# Patient Record
Sex: FEMALE | Race: WHITE | Employment: FULL TIME | URBAN - METROPOLITAN AREA
[De-identification: names, ages, dates, MRNs, and addresses within clinical notes are randomized per-mention and may not be internally consistent; named-entity substitution may affect disease eponyms.]

---

## 2021-01-18 ENCOUNTER — TELEPHONE (OUTPATIENT)
Dept: SCHEDULING | Facility: IMAGING CENTER | Age: 56
End: 2021-01-18

## 2021-11-02 ENCOUNTER — OFFICE VISIT (OUTPATIENT)
Dept: MEDICAL GROUP | Facility: CLINIC | Age: 56
End: 2021-11-02
Payer: COMMERCIAL

## 2021-11-02 VITALS
HEART RATE: 82 BPM | OXYGEN SATURATION: 94 % | BODY MASS INDEX: 23.81 KG/M2 | DIASTOLIC BLOOD PRESSURE: 99 MMHG | SYSTOLIC BLOOD PRESSURE: 156 MMHG | HEIGHT: 65 IN | WEIGHT: 142.9 LBS

## 2021-11-02 DIAGNOSIS — M13.0 POLYARTHRITIS: ICD-10-CM

## 2021-11-02 DIAGNOSIS — I10 HYPERTENSION, UNSPECIFIED TYPE: ICD-10-CM

## 2021-11-02 PROCEDURE — 99205 OFFICE O/P NEW HI 60 MIN: CPT | Mod: GC | Performed by: STUDENT IN AN ORGANIZED HEALTH CARE EDUCATION/TRAINING PROGRAM

## 2021-11-02 RX ORDER — OMEPRAZOLE 20 MG/1
20 CAPSULE, DELAYED RELEASE ORAL DAILY
COMMUNITY
End: 2022-07-28 | Stop reason: SDUPTHER

## 2021-11-02 RX ORDER — METAXALONE 400 MG/1
800 TABLET ORAL 3 TIMES DAILY
Qty: 180 TABLET | Refills: 0 | Status: SHIPPED | OUTPATIENT
Start: 2021-11-02 | End: 2021-11-16

## 2021-11-02 RX ORDER — LISINOPRIL 20 MG/1
TABLET ORAL
COMMUNITY
Start: 2021-09-15 | End: 2021-11-02 | Stop reason: SINTOL

## 2021-11-02 RX ORDER — LOSARTAN POTASSIUM 25 MG/1
25 TABLET ORAL DAILY
Qty: 30 TABLET | Refills: 1 | Status: SHIPPED | OUTPATIENT
Start: 2021-11-02 | End: 2021-12-13 | Stop reason: SDUPTHER

## 2021-11-02 NOTE — PROGRESS NOTES
Subjective:     CC:  Establish care     HISTORY OF THE PRESENT ILLNESS: Patient is a 56 y.o. female. This pleasant patient is here today to establish care and discuss hypertension and diffuse joint pain.   She has a history of hypertension, currently on lisinopril for the last 2 months and she states is giving her unwanted side effects and not adequately controlling her blood pressure.  She states she has always had elevated blood pressure but no one had addressed until a couple of months ago.  She denies any family history of hypertension.  She presented today with joint pain in multiple joints including bilateral elbows, bilateral knees, bilateral shoulders, bilateral hips, and bilateral hands.  She states that when she wakes up in the morning her hands are swollen and stiff, and this takes several hours for the swelling to resolve and the pain only minimally improves.  Per patient, she previously had a work-up done for rheumatoid arthritis about 5 years ago that was positive but no treatment was started at this time.  She has a history of bleeding ulcers so she does not frequently take NSAIDs.  She has been taking for 1000 mg Tylenol twice daily without alleviation.  She states that she recently decreased her Tylenol use because she had elevated liver enzymes.  She works as a  and is going to school to become a , and this pain is now interfering with her daily life.  Her diet consists of only plant-based as she has not tolerated dairy products or meat well in the past.  She also has complaints today of chronic abdominal pain, but states that recently she has felt better.  She has no change in bowel movements.      Problem   Polyarthritis   Hypertension       Current Outpatient Medications Ordered in Epic   Medication Sig Dispense Refill   • omeprazole (PRILOSEC) 20 MG delayed-release capsule Take 20 mg by mouth every day.     • losartan (COZAAR) 25 MG Tab Take 1 Tablet by mouth every  "day. 30 Tablet 1   • metaxalone (SKELAXIN) 400 MG Tab Take 2 Tablets by mouth 3 times a day. 180 Tablet 0     No current Epic-ordered facility-administered medications on file.       Health Maintenance: Colonoscopy up-to-date and was normal.  Hysterectomy several years ago, and recently had vaginal exam performed by gynecologist which she states was normal.  She has a mammogram scheduled.    ROS:   Gen: no fevers/chills, no changes in weight  Eyes: no changes in vision  ENT: no sore throat, no hearing loss, no bloody nose  Pulm: no sob, no cough  CV: no chest pain, no palpitations  GI: no nausea/vomiting, no diarrhea. Abdominal pain-intermittent.   : no dysuria  MSk: Joint pain  Skin: no rash  Neuro: no headaches, no numbness/tingling  Heme/Lymph: no easy bruising      Objective:     Exam: /99 (BP Location: Right arm, Patient Position: Sitting, BP Cuff Size: Adult)   Pulse 82   Ht 1.638 m (5' 4.5\")   Wt 64.8 kg (142 lb 14.4 oz)   SpO2 94%  Body mass index is 24.15 kg/m².    General: Normal appearing. No distress.  HEENT: Normocephalic. Eyes conjunctiva clear lids without ptosis, pupils equal and reactive to light accommodation, ears normal shape and contour  Neck: Supple without JVD  Pulmonary: Clear to ausculation.  Normal effort. No rales, ronchi, or wheezing.  Cardiovascular: Regular rate and rhythm without murmur. Carotid and radial pulses are intact and equal bilaterally.  Abdomen: Diffuse tenderness to palpation.  Well-healed surgical scars noted to the abdomen. Soft, nondistended. Normal bowel sounds. Liver and spleen are not palpable  Neurologic: Grossly nonfocal  Skin: Warm and dry.  No obvious lesions.  Musculoskeletal: Tenderness to palpation in PIP and MCP joints of bilaterally.  Tenderness to palpation in bilateral elbows. No extremity cyanosis, clubbing, or edema.  Psych: Normal mood and affect. Alert and oriented x3. Judgment and insight is normal.    Labs: CRP, sed rate, RA factor, and " CCP antibody    Assessment & Plan:   56 y.o. female with the following -    Problem List Items Addressed This Visit     Polyarthritis     Patient with several year history of diffuse joint pain.  Per patient about 5 years ago she tested positive for rheumatoid arthritis however states that she was not started on treatment at this time.  She has had increased joint pain over the last several months that is now interfering with her daily life as she is a  and currently in school.  She has a history of bleeding ulcers and currently limits her intake of NSAIDs.  She has tried 4 1000 mg Tylenol twice daily without improvement as well as CBD oil and the joints and hot showers with minimal alleviation.  Repeat labs for evaluation of rheumatoid arthritis ordered.  Metaxalone 800 mg 3 times daily for pain control as patient is unable to tolerate pain medications and states that this has worked for her in the past.  Patient will follow up in 1 month.         Relevant Medications    metaxalone (SKELAXIN) 400 MG Tab    Other Relevant Orders    RHEUMATOID ARTHRITIS FACTOR    CCP ANTIBODY    Sed Rate    CRP QUANTITIVE (NON-CARDIAC)    Hypertension     Patient with chronic history of hypertension currently taking lisinopril which she states has not worked for her and is giving her unwanted side effects.  Her blood pressure was still elevated at 156/99 today in the office.  Discontinued lisinopril and started losartan 25 mg daily and instructed patient to perform ambulatory blood pressure monitoring.  She recently had lab work done at [place] which included a CBC and CMP, will plan to obtain lab results.  If unable to obtain lab results will order repeat CBC and CMP at next visit.  Patient will follow up in 1 month.         Relevant Medications    losartan (COZAAR) 25 MG Tab          I spent a total of 60 minutes with record review, exam, communication with the patient, communication with other providers, and  documentation of this encounter.    Return in about 4 weeks (around 11/30/2021) for Long.    Please note that this dictation was created using voice recognition software. I have made every reasonable attempt to correct obvious errors, but I expect that there are errors of grammar and possibly content that I did not discover before finalizing the note.

## 2021-11-02 NOTE — ASSESSMENT & PLAN NOTE
Patient with several year history of diffuse joint pain.  Per patient about 5 years ago she tested positive for rheumatoid arthritis however states that she was not started on treatment at this time.  She has had increased joint pain over the last several months that is now interfering with her daily life as she is a  and currently in school.  She has a history of bleeding ulcers and currently limits her intake of NSAIDs.  She has tried 4 1000 mg Tylenol twice daily without improvement as well as CBD oil and the joints and hot showers with minimal alleviation.  Repeat labs for evaluation of rheumatoid arthritis ordered.  Metaxalone 800 mg 3 times daily for pain control as patient is unable to tolerate pain medications and states that this has worked for her in the past.  Patient will follow up in 1 month.

## 2021-11-02 NOTE — ASSESSMENT & PLAN NOTE
Patient with chronic history of hypertension currently taking lisinopril which she states has not worked for her and is giving her unwanted side effects.  Her blood pressure was still elevated at 156/99 today in the office.  Discontinued lisinopril and started losartan 25 mg daily and instructed patient to perform ambulatory blood pressure monitoring.  She recently had lab work done at [place] which included a CBC and CMP, will plan to obtain lab results.  If unable to obtain lab results will order repeat CBC and CMP at next visit.  Patient will follow up in 1 month.

## 2021-11-15 ENCOUNTER — TELEPHONE (OUTPATIENT)
Dept: MEDICAL GROUP | Facility: CLINIC | Age: 56
End: 2021-11-15

## 2021-11-15 NOTE — TELEPHONE ENCOUNTER
Patient came in today with some medication questions from her visit with you on 11/2.  1) Losartan is not working for her. She said she can feel when it is getting high, took her BP at home and it was ~160/100. She is wondering if she should up the dose of Losartan.   2) The muscle relaxer (metaxalone) prescribed is $500 with insurance. She is wondering if there is an alternative medication she can try.    She also got her labs done, I am going to schedule her a follow up appointment per the pt to go over labs and possibly high BP.  Thank you!

## 2021-11-16 RX ORDER — CYCLOBENZAPRINE HCL 5 MG
5-10 TABLET ORAL 3 TIMES DAILY PRN
Qty: 30 TABLET | Refills: 0 | Status: SHIPPED | OUTPATIENT
Start: 2021-11-16 | End: 2021-12-01

## 2021-11-16 NOTE — PROGRESS NOTES
Patient called the office and stated that the medication is too much with insurance. Discontinued Skelaxin and sent prescription for Flexeril with plan for patient to follow-up in the office for back pain.

## 2021-11-16 NOTE — TELEPHONE ENCOUNTER
I have advised her to up her dose of Losartan to 50mg, patient expressed understanding. I have scheduled her for a follow up appointment. She wanted me to let you know that her pain has gotten much worse and she is not sure she can wait until her appointment for further evaluation regarding pain. Please let me know further information I can give to the patient. Thank you!

## 2021-11-30 ENCOUNTER — TELEPHONE (OUTPATIENT)
Dept: MEDICAL GROUP | Facility: CLINIC | Age: 56
End: 2021-11-30

## 2021-11-30 NOTE — TELEPHONE ENCOUNTER
VOICEMAIL  1. Caller Name: Courtney Gupta                      Call Back Number: [phone]    2. Message: Pt L/M regarding appointment time and lab results. I called patient and L/M requesting call back    3. Patient approves office to leave a detailed voicemail/MyChart message: no

## 2021-12-01 ENCOUNTER — OFFICE VISIT (OUTPATIENT)
Dept: MEDICAL GROUP | Facility: CLINIC | Age: 56
End: 2021-12-01
Payer: COMMERCIAL

## 2021-12-01 VITALS
RESPIRATION RATE: 16 BRPM | OXYGEN SATURATION: 97 % | HEART RATE: 94 BPM | SYSTOLIC BLOOD PRESSURE: 146 MMHG | WEIGHT: 144 LBS | DIASTOLIC BLOOD PRESSURE: 94 MMHG | BODY MASS INDEX: 23.99 KG/M2 | HEIGHT: 65 IN

## 2021-12-01 DIAGNOSIS — R53.82 CHRONIC FATIGUE: ICD-10-CM

## 2021-12-01 DIAGNOSIS — I10 HYPERTENSION, UNSPECIFIED TYPE: ICD-10-CM

## 2021-12-01 DIAGNOSIS — M13.0 POLYARTHRITIS: ICD-10-CM

## 2021-12-01 DIAGNOSIS — G89.29 CHRONIC NONINTRACTABLE HEADACHE, UNSPECIFIED HEADACHE TYPE: ICD-10-CM

## 2021-12-01 DIAGNOSIS — R51.9 CHRONIC NONINTRACTABLE HEADACHE, UNSPECIFIED HEADACHE TYPE: ICD-10-CM

## 2021-12-01 DIAGNOSIS — R06.83 SNORING: ICD-10-CM

## 2021-12-01 PROCEDURE — 99214 OFFICE O/P EST MOD 30 MIN: CPT | Mod: GC | Performed by: STUDENT IN AN ORGANIZED HEALTH CARE EDUCATION/TRAINING PROGRAM

## 2021-12-01 RX ORDER — CYCLOBENZAPRINE HCL 5 MG
5 TABLET ORAL 3 TIMES DAILY PRN
Qty: 30 TABLET | Refills: 0 | Status: SHIPPED | OUTPATIENT
Start: 2021-12-01 | End: 2021-12-01

## 2021-12-01 RX ORDER — CYCLOBENZAPRINE HCL 5 MG
5 TABLET ORAL 3 TIMES DAILY PRN
Qty: 30 TABLET | Refills: 0 | Status: SHIPPED | OUTPATIENT
Start: 2021-12-01 | End: 2021-12-22

## 2021-12-01 RX ORDER — MELOXICAM 7.5 MG/1
7.5 TABLET ORAL DAILY
Qty: 30 TABLET | Refills: 1 | Status: SHIPPED | OUTPATIENT
Start: 2021-12-01 | End: 2022-02-02

## 2021-12-01 RX ORDER — MELOXICAM 7.5 MG/1
7.5 TABLET ORAL DAILY
Qty: 30 TABLET | Refills: 1 | Status: SHIPPED | OUTPATIENT
Start: 2021-12-01 | End: 2021-12-01

## 2021-12-01 RX ORDER — AMLODIPINE BESYLATE 5 MG/1
5 TABLET ORAL DAILY
Qty: 30 TABLET | Refills: 1 | Status: SHIPPED | OUTPATIENT
Start: 2021-12-01 | End: 2022-03-09 | Stop reason: SDUPTHER

## 2021-12-01 RX ORDER — AMLODIPINE BESYLATE 5 MG/1
5 TABLET ORAL DAILY
Qty: 30 TABLET | Refills: 1 | Status: SHIPPED | OUTPATIENT
Start: 2021-12-01 | End: 2021-12-01

## 2021-12-01 ASSESSMENT — ENCOUNTER SYMPTOMS
EYES NEGATIVE: 1
MYALGIAS: 1
HEADACHES: 1
GASTROINTESTINAL NEGATIVE: 1
RESPIRATORY NEGATIVE: 1
CARDIOVASCULAR NEGATIVE: 1

## 2021-12-02 NOTE — ASSESSMENT & PLAN NOTE
Chronic, uncontrolled  Patient has a history of chronic fatigue, that she states she has been dealing with for decades.  Per patient, she was previously worked up for thyroid disorder which was negative.  She wakes up and does not feel refreshed, and is feeling like she needs to go to bed around 2 PM daily and does not have the energy to spend time with her family.  She also has a history of snoring as well as daily headaches.  She also has a history of hypertension, difficult to control with medication referral to pulmonology and sleep ordered to evaluate for sleep apnea.  Patient will follow up in 2 to 3 weeks.

## 2021-12-02 NOTE — ASSESSMENT & PLAN NOTE
Patient with self-reported history of rheumatoid arthritis, and has never been treated for this.  Her rheumatoid factor was slightly elevated 22.3 but CCP antibody, CRP, sed rate all within normal limits.  Discussed use of NSAIDs for treatment of arthritis.  Patient has a history of bleeding ulcers while previously taking Advil twice daily.  She is currently on omeprazole 20 mg once daily and has been doing well on this.  Discussed with patient, who feels comfortable in trying meloxicam daily to help with the joint pain.  Prescription for 7.5 mg meloxicam daily sent to pharmacy with plan for patient to follow-up in 2 to 3 weeks.  Patient understood and verbalized agreement to this plan.

## 2021-12-02 NOTE — ASSESSMENT & PLAN NOTE
Chronic, uncontrolled  Patient has a history of frequent headaches which are not controlled with Tylenol.  She has had these headaches for several years.  These are associated with fatigue and snoring, referral to pulmonology and sleep medicine placed to evaluate for sleep apnea.  Patient will follow up in 2 weeks.

## 2021-12-02 NOTE — ASSESSMENT & PLAN NOTE
Chronic, uncontrolled.  Patient increased 25 mg losartan daily to 50 mg losartan daily after a discussion approximately 2 weeks ago her blood pressure was still uncontrolled on low-dose losartan.  She is still having uncontrolled blood pressure with blood pressure in office today 146/94.  She has not been monitoring her blood pressure at home.  She is occasionally symptomatic with some dizziness and headaches.  amlodipine 5 mg daily was added to medication for better control blood pressure.  Discussed with patient that she should monitor her blood pressure at home approximately 2-3 times per week and document these.  Patient will follow up in 2 to 3 weeks.  Patient understood and verbalized agreement to this plan.

## 2021-12-02 NOTE — PROGRESS NOTES
Subjective:     CC: Follow-up    HPI:   Courtney presents today for follow-up on labs, joint pain, and high blood pressure.  She states that she increased her dose of losartan from 25 mg daily to 50 mg daily with minimal improvement in her blood pressure. She is still having headaches as well as occasionally feeling lightheaded. She does not monitor her blood pressure at home.  She is still having joint pain and bilateral shoulders, bilateral elbows, bilateral hands, and bilateral knees. She was unable to  the prescription for the Flexeril, and she states pharmacy needed to get in contact with the clinic. She has been taking Tylenol at home without alleviation of the pain.  She does have a previous diagnosis of rheumatoid arthritis, but was never treated for this.  She denies any abdominal pain, chest pain, shortness of breath, or syncope.    Problem   Chronic Fatigue   Chronic Nonintractable Headache   Polyarthritis   Hypertension       Current Outpatient Medications Ordered in Epic   Medication Sig Dispense Refill   • amLODIPine (NORVASC) 5 MG Tab Take 1 Tablet by mouth every day. 30 Tablet 1   • cyclobenzaprine (FLEXERIL) 5 mg tablet Take 1 Tablet by mouth 3 times a day as needed. 30 Tablet 0   • meloxicam (MOBIC) 7.5 MG Tab Take 1 Tablet by mouth every day. 30 Tablet 1   • losartan (COZAAR) 25 MG Tab Take 1 Tablet by mouth every day. (Patient taking differently: Take 50 mg by mouth every day.) 30 Tablet 1   • omeprazole (PRILOSEC) 20 MG delayed-release capsule Take 20 mg by mouth every day.       No current [facility]-ordered facility-administered medications on file.       ROS:  Review of Systems   Constitutional: Positive for malaise/fatigue.   HENT: Negative.    Eyes: Negative.    Respiratory: Negative.    Cardiovascular: Negative.    Gastrointestinal: Negative.    Musculoskeletal: Positive for joint pain and myalgias.   Skin: Negative.    Neurological: Positive for headaches.       Objective:     Exam:  BP  "146/94 (BP Location: Right arm, Patient Position: Sitting, BP Cuff Size: Adult)   Pulse 94   Resp 16   Ht 1.638 m (5' 4.5\")   Wt 65.3 kg (144 lb)   SpO2 97%   BMI 24.34 kg/m²  Body mass index is 24.34 kg/m².    Physical Exam  Constitutional:       General: She is not in acute distress.     Appearance: Normal appearance. She is normal weight.   HENT:      Head: Normocephalic and atraumatic.   Eyes:      Extraocular Movements: Extraocular movements intact.   Cardiovascular:      Rate and Rhythm: Normal rate and regular rhythm.      Heart sounds: No murmur heard.      Pulmonary:      Effort: Pulmonary effort is normal. No respiratory distress.      Breath sounds: Normal breath sounds. No wheezing.   Abdominal:      General: Abdomen is flat. There is no distension.   Musculoskeletal:         General: Normal range of motion.      Cervical back: Normal range of motion.   Skin:     General: Skin is warm and dry.      Findings: No rash.   Neurological:      General: No focal deficit present.      Mental Status: She is alert.         Labs:   CRP within normal limits  Sed rate within normal limits  CCP antibody within normal limits  Rheumatoid arthritis factor elevated at 22.3.    Assessment & Plan:     56 y.o. female with the following -     Problem List Items Addressed This Visit     Polyarthritis     Patient with self-reported history of rheumatoid arthritis, and has never been treated for this.  Her rheumatoid factor was slightly elevated 22.3 but CCP antibody, CRP, sed rate all within normal limits.  Discussed use of NSAIDs for treatment of arthritis.  Patient has a history of bleeding ulcers while previously taking Advil twice daily.  She is currently on omeprazole 20 mg once daily and has been doing well on this.  Discussed with patient, who feels comfortable in trying meloxicam daily to help with the joint pain.  Prescription for 7.5 mg meloxicam daily sent to pharmacy with plan for patient to follow-up in 2 to " 3 weeks.  Patient understood and verbalized agreement to this plan.         Hypertension     Chronic, uncontrolled.  Patient increased 25 mg losartan daily to 50 mg losartan daily after a discussion approximately 2 weeks ago her blood pressure was still uncontrolled on low-dose losartan.  She is still having uncontrolled blood pressure with blood pressure in office today 146/94.  She has not been monitoring her blood pressure at home.  She is occasionally symptomatic with some dizziness and headaches.  amlodipine 5 mg daily was added to medication for better control blood pressure.  Discussed with patient that she should monitor her blood pressure at home approximately 2-3 times per week and document these.  Patient will follow up in 2 to 3 weeks.  Patient understood and verbalized agreement to this plan.         Relevant Medications    amLODIPine (NORVASC) 5 MG Tab    Other Relevant Orders    Referral to Pulmonary and Sleep Medicine    Chronic fatigue     Chronic, uncontrolled  Patient has a history of chronic fatigue, that she states she has been dealing with for decades.  Per patient, she was previously worked up for thyroid disorder which was negative.  She wakes up and does not feel refreshed, and is feeling like she needs to go to bed around 2 PM daily and does not have the energy to spend time with her family.  She also has a history of snoring as well as daily headaches.  She also has a history of hypertension, difficult to control with medication referral to pulmonology and sleep ordered to evaluate for sleep apnea.  Patient will follow up in 2 to 3 weeks.         Relevant Orders    Referral to Pulmonary and Sleep Medicine    Chronic nonintractable headache     Chronic, uncontrolled  Patient has a history of frequent headaches which are not controlled with Tylenol.  She has had these headaches for several years.  These are associated with fatigue and snoring, referral to pulmonology and sleep medicine  placed to evaluate for sleep apnea.  Patient will follow up in 2 weeks.         Relevant Orders    Referral to Pulmonary and Sleep Medicine      Other Visit Diagnoses     Snoring        Relevant Orders    Referral to Pulmonary and Sleep Medicine          I spent a total of 45 minutes with record review, exam, communication with the patient, communication with other providers, and documentation of this encounter.      Return in about 2 weeks (around 12/15/2021) for Short.    Please note that this dictation was created using voice recognition software. I have made every reasonable attempt to correct obvious errors, but I expect that there are errors of grammar and possibly content that I did not discover before finalizing the note.

## 2022-02-02 RX ORDER — MELOXICAM 7.5 MG/1
7.5 TABLET ORAL DAILY
Qty: 30 TABLET | Refills: 1 | Status: SHIPPED | OUTPATIENT
Start: 2022-02-02 | End: 2022-04-08

## 2022-04-05 DIAGNOSIS — I10 HYPERTENSION, UNSPECIFIED TYPE: ICD-10-CM

## 2022-04-05 RX ORDER — LOSARTAN POTASSIUM 50 MG/1
50 TABLET ORAL DAILY
Qty: 30 TABLET | Refills: 3 | Status: SHIPPED | OUTPATIENT
Start: 2022-04-05 | End: 2022-06-19 | Stop reason: SDUPTHER

## 2022-04-08 RX ORDER — MELOXICAM 7.5 MG/1
7.5 TABLET ORAL DAILY
Qty: 30 TABLET | Refills: 1 | Status: SHIPPED | OUTPATIENT
Start: 2022-04-08 | End: 2022-05-21 | Stop reason: SDUPTHER

## 2022-04-08 RX ORDER — MELOXICAM 7.5 MG/1
7.5 TABLET ORAL DAILY
Qty: 30 TABLET | Refills: 1 | Status: SHIPPED | OUTPATIENT
Start: 2022-04-08 | End: 2022-04-08

## 2022-05-23 RX ORDER — CYCLOBENZAPRINE HCL 5 MG
5 TABLET ORAL 3 TIMES DAILY PRN
Qty: 90 TABLET | Refills: 0 | Status: SHIPPED | OUTPATIENT
Start: 2022-05-23 | End: 2022-06-19 | Stop reason: SDUPTHER

## 2022-05-23 NOTE — TELEPHONE ENCOUNTER
Patient called again requesting refill of medication. She states she is in a lot of pain without it, please advise. Thank you!

## 2022-06-19 DIAGNOSIS — I10 HYPERTENSION, UNSPECIFIED TYPE: ICD-10-CM

## 2022-06-20 RX ORDER — LOSARTAN POTASSIUM 50 MG/1
50 TABLET ORAL DAILY
Qty: 90 TABLET | Refills: 3 | Status: SHIPPED | OUTPATIENT
Start: 2022-06-20 | End: 2022-07-28 | Stop reason: SDUPTHER

## 2022-07-28 ENCOUNTER — OFFICE VISIT (OUTPATIENT)
Dept: MEDICAL GROUP | Facility: CLINIC | Age: 57
End: 2022-07-28

## 2022-07-28 VITALS
SYSTOLIC BLOOD PRESSURE: 120 MMHG | HEART RATE: 85 BPM | TEMPERATURE: 97.9 F | DIASTOLIC BLOOD PRESSURE: 80 MMHG | WEIGHT: 138.8 LBS | RESPIRATION RATE: 14 BRPM | BODY MASS INDEX: 23.13 KG/M2 | HEIGHT: 65 IN | OXYGEN SATURATION: 98 %

## 2022-07-28 DIAGNOSIS — R00.2 PALPITATIONS: ICD-10-CM

## 2022-07-28 DIAGNOSIS — Z12.31 ENCOUNTER FOR SCREENING MAMMOGRAM FOR MALIGNANT NEOPLASM OF BREAST: ICD-10-CM

## 2022-07-28 DIAGNOSIS — Z12.12 SCREENING FOR COLORECTAL CANCER: ICD-10-CM

## 2022-07-28 DIAGNOSIS — Z23 NEED FOR VACCINATION: ICD-10-CM

## 2022-07-28 DIAGNOSIS — Z12.11 SCREENING FOR COLORECTAL CANCER: ICD-10-CM

## 2022-07-28 DIAGNOSIS — I10 HYPERTENSION, UNSPECIFIED TYPE: ICD-10-CM

## 2022-07-28 DIAGNOSIS — N20.0 KIDNEY STONES: ICD-10-CM

## 2022-07-28 DIAGNOSIS — N81.10 VAGINAL PROLAPSE: ICD-10-CM

## 2022-07-28 PROCEDURE — 90471 IMMUNIZATION ADMIN: CPT | Performed by: STUDENT IN AN ORGANIZED HEALTH CARE EDUCATION/TRAINING PROGRAM

## 2022-07-28 PROCEDURE — 99214 OFFICE O/P EST MOD 30 MIN: CPT | Mod: 25,GC | Performed by: STUDENT IN AN ORGANIZED HEALTH CARE EDUCATION/TRAINING PROGRAM

## 2022-07-28 PROCEDURE — 90715 TDAP VACCINE 7 YRS/> IM: CPT | Performed by: STUDENT IN AN ORGANIZED HEALTH CARE EDUCATION/TRAINING PROGRAM

## 2022-07-28 RX ORDER — LOSARTAN POTASSIUM 50 MG/1
50 TABLET ORAL DAILY
Qty: 90 TABLET | Refills: 3 | Status: SHIPPED | OUTPATIENT
Start: 2022-07-28 | End: 2022-09-19 | Stop reason: SDUPTHER

## 2022-07-28 RX ORDER — CYCLOBENZAPRINE HCL 5 MG
5 TABLET ORAL 3 TIMES DAILY PRN
Qty: 90 TABLET | Refills: 0 | Status: SHIPPED | OUTPATIENT
Start: 2022-07-28 | End: 2022-09-19 | Stop reason: SDUPTHER

## 2022-07-28 RX ORDER — MELOXICAM 7.5 MG/1
7.5 TABLET ORAL DAILY
Qty: 30 TABLET | Refills: 1 | Status: SHIPPED | OUTPATIENT
Start: 2022-07-28 | End: 2022-10-17 | Stop reason: SDUPTHER

## 2022-07-28 RX ORDER — OMEPRAZOLE 20 MG/1
20 CAPSULE, DELAYED RELEASE ORAL 2 TIMES DAILY
Qty: 60 CAPSULE | Refills: 0 | Status: SHIPPED | OUTPATIENT
Start: 2022-07-28 | End: 2022-08-27

## 2022-07-28 RX ORDER — AMLODIPINE BESYLATE 5 MG/1
5 TABLET ORAL DAILY
Qty: 90 TABLET | Refills: 3 | Status: SHIPPED | OUTPATIENT
Start: 2022-07-28 | End: 2023-01-06 | Stop reason: SDUPTHER

## 2022-07-28 ASSESSMENT — ENCOUNTER SYMPTOMS
BACK PAIN: 1
CONSTITUTIONAL NEGATIVE: 1
GASTROINTESTINAL NEGATIVE: 1
RESPIRATORY NEGATIVE: 1
PALPITATIONS: 1

## 2022-07-28 ASSESSMENT — PATIENT HEALTH QUESTIONNAIRE - PHQ9: CLINICAL INTERPRETATION OF PHQ2 SCORE: 0

## 2022-07-28 NOTE — ASSESSMENT & PLAN NOTE
History of hysterectomy.  Patient with a several year history of vaginal prolapse that was previously evaluated and states that this has been significantly worsening to a point where she has large prolapse following a days work.  Patient would like referral for evaluation for surgical intervention at this time.  Urgent referral sent to Dr. Maher with urogynecology for further evaluation for surgical intervention.

## 2022-07-28 NOTE — PROGRESS NOTES
"Subjective:     CC: follow-up, vaginal prolapse, palpitations, blood pressure    HPI:   Courtney presents today for medication follow-up and to discuss palpitations, blood pressure, and vaginal prolapse. Patient states that she has been out of her blood pressure medications for approximately 1 week as she had a change in insurance and was paying out of pocket for these.  She has otherwise been doing well on these medications.  Patient reports history of vaginal prolapse that has significantly worsened recently. She had a hysterectomy several years ago.  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  She also states that she recently has noticed some darkening of her labia and at one point she described these as black in color.  Patient reports that she has been doing well on the meloxicam and has been using Flexeril intermittently.  She states that she has been doing vitamin B12 injection as well as other vitamin shots and has felt significantly better following these.  She has also been improving diet to avoid inflammatory foods and has been feeling well doing this.  Patient states that she has been getting palpitations, mainly when she is laying down at night.  She denies any chest pain or shortness of breath associated with this.    Current Outpatient Medications Ordered in Epic   Medication Sig Dispense Refill   • amLODIPine (NORVASC) 5 MG Tab Take 1 Tablet by mouth every day. 90 Tablet 3   • meloxicam (MOBIC) 7.5 MG Tab Take 1 Tablet by mouth every day. 30 Tablet 1   • cyclobenzaprine (FLEXERIL) 5 mg tablet Take 1 Tablet by mouth 3 times a day as needed for Moderate Pain or Muscle Spasms. 90 Tablet 0   • losartan (COZAAR) 50 MG Tab Take 1 Tablet by mouth every day. 90 Tablet 3   • omeprazole (PRILOSEC) 20 MG delayed-release capsule Take 1 Capsule by mouth 2 times a day for 30 days. " "60 Capsule 0     No current Epic-ordered facility-administered medications on file.       Health Maintenance: Completed    ROS:  Review of Systems   Constitutional: Negative.    Respiratory: Negative.    Cardiovascular: Positive for palpitations.   Gastrointestinal: Negative.    Genitourinary:        Kidney stones   Musculoskeletal: Positive for back pain (chronic).       Objective:     Exam:  /80 (BP Location: Right arm, Patient Position: Sitting, BP Cuff Size: Adult)   Pulse 85   Temp 36.6 °C (97.9 °F) (Temporal)   Resp 14   Ht 1.638 m (5' 4.5\")   Wt 63 kg (138 lb 12.8 oz)   SpO2 98%   BMI 23.46 kg/m²  Body mass index is 23.46 kg/m².    Physical Exam  Constitutional:       General: She is not in acute distress.     Appearance: Normal appearance. She is normal weight.   HENT:      Head: Normocephalic and atraumatic.   Eyes:      Extraocular Movements: Extraocular movements intact.      Conjunctiva/sclera: Conjunctivae normal.   Cardiovascular:      Rate and Rhythm: Normal rate and regular rhythm.      Heart sounds: No murmur heard.  Pulmonary:      Effort: Pulmonary effort is normal. No respiratory distress.      Breath sounds: Normal breath sounds. No wheezing.   Abdominal:      General: Abdomen is flat.      Palpations: Abdomen is soft.   Musculoskeletal:         General: Normal range of motion.      Cervical back: Normal range of motion.   Skin:     General: Skin is warm and dry.   Neurological:      General: No focal deficit present.      Mental Status: She is alert.   Psychiatric:         Mood and Affect: Mood normal.         Behavior: Behavior normal.         Thought Content: Thought content normal.         Judgment: Judgment normal.           Assessment & Plan:     56 y.o. female with the following -     Problem List Items Addressed This Visit     Hypertension     Patient with a history of hypertension on amlodipine 5 mg daily and losartan 50 mg daily.  She has been tolerating his medications " well.  Blood pressure in the office today is 120/80.  Patient has been out of medications for approximately 1 week to no insurance changes.  Refill sent for amlodipine and losartan.  Patient will follow up in 3 months.           Relevant Medications    amLODIPine (NORVASC) 5 MG Tab    losartan (COZAAR) 50 MG Tab    Palpitations     Patient with several month history of palpitations that are more significant when she is laying down.  There is no associated chest pain or shortness of breath with these.  EKG in the office today unremarkable.  Referral sent to cardiology for Holter monitor.  CBC ordered to evaluate for anemia.  CMP ordered to evaluate for electrolyte abnormalities as patient has a history of low potassium.  ER precautions discussed with patient including chest pain or shortness of breath and if she develop the symptoms to be repeated to be seen in the ER.  Patient agreeable to plan.           Relevant Orders    Comp Metabolic Panel    CBC WITH DIFFERENTIAL    EKG - Clinic Performed    REFERRAL TO CARDIOLOGY    Vaginal prolapse     History of hysterectomy.  Patient with a several year history of vaginal prolapse that was previously evaluated and states that this has been significantly worsening to a point where she has large prolapse following a days work.  Patient would like referral for evaluation for surgical intervention at this time.  Urgent referral sent to Dr. Maher with urogynecology for further evaluation for surgical intervention.           Relevant Orders    Referral to Urogynecology      Other Visit Diagnoses     Screening for colorectal cancer        Declines colonoscopy.  Agreeable to Cologuard.  Form faxed for Cologuard.  Patient understands if abnormal, will need colonoscopy.    Relevant Orders    COLOGUARD (FIT DNA)    Encounter for screening mammogram for malignant neoplasm of breast        Has not had mammogram done in several years.  Referral sent for mammogram for screening of  malignant neoplasm of breast.    Relevant Orders    MA-SCREENING MAMMO BILAT W/TOMOSYNTHESIS W/CAD    Kidney stones        Recurrent kidney stones.  Most recent a couple of weeks ago.  Referral sent to urology for further evaluation.    Relevant Orders    Referral to Urology    Need for vaccination        Tdap given patient instructed go to pharmacy for Shingrix.    Relevant Orders    Tdap Vaccine =>8YO IM (Completed)          I spent a total of 75 minutes with record review, exam, communication with the patient, communication with other providers, and documentation of this encounter.      Return in about 3 months (around 10/28/2022).      Gale Davis MD  [facility] PGY-2

## 2022-07-28 NOTE — ASSESSMENT & PLAN NOTE
Patient with a history of hypertension on amlodipine 5 mg daily and losartan 50 mg daily.  She has been tolerating his medications well.  Blood pressure in the office today is 120/80.  Patient has been out of medications for approximately 1 week to no insurance changes.  Refill sent for amlodipine and losartan.  Patient will follow up in 3 months.

## 2022-07-28 NOTE — ASSESSMENT & PLAN NOTE
Patient with several month history of palpitations that are more significant when she is laying down.  There is no associated chest pain or shortness of breath with these.  EKG in the office today unremarkable.  Referral sent to cardiology for Holter monitor.  CBC ordered to evaluate for anemia.  CMP ordered to evaluate for electrolyte abnormalities as patient has a history of low potassium.  ER precautions discussed with patient including chest pain or shortness of breath and if she develop the symptoms to be repeated to be seen in the ER.  Patient agreeable to plan.

## 2022-08-04 ENCOUNTER — HOSPITAL ENCOUNTER (OUTPATIENT)
Dept: RADIOLOGY | Facility: MEDICAL CENTER | Age: 57
End: 2022-08-04

## 2022-09-09 RX ORDER — OMEPRAZOLE 20 MG/1
20 CAPSULE, DELAYED RELEASE ORAL 2 TIMES DAILY
COMMUNITY
End: 2022-09-09 | Stop reason: SDUPTHER

## 2022-09-12 RX ORDER — OMEPRAZOLE 20 MG/1
20 CAPSULE, DELAYED RELEASE ORAL 2 TIMES DAILY
Qty: 180 CAPSULE | Refills: 3 | Status: SHIPPED | OUTPATIENT
Start: 2022-09-12 | End: 2023-07-07

## 2022-09-19 DIAGNOSIS — I10 HYPERTENSION, UNSPECIFIED TYPE: ICD-10-CM

## 2022-09-20 RX ORDER — CYCLOBENZAPRINE HCL 5 MG
5 TABLET ORAL 3 TIMES DAILY PRN
Qty: 90 TABLET | Refills: 0 | Status: SHIPPED | OUTPATIENT
Start: 2022-09-20 | End: 2022-10-21 | Stop reason: SDUPTHER

## 2022-09-20 RX ORDER — LOSARTAN POTASSIUM 50 MG/1
50 TABLET ORAL DAILY
Qty: 90 TABLET | Refills: 3 | Status: SHIPPED | OUTPATIENT
Start: 2022-09-20 | End: 2022-10-21 | Stop reason: SDUPTHER

## 2022-10-03 ENCOUNTER — TELEPHONE (OUTPATIENT)
Dept: CARDIOLOGY | Facility: MEDICAL CENTER | Age: 57
End: 2022-10-03

## 2022-10-06 ENCOUNTER — TELEPHONE (OUTPATIENT)
Dept: CARDIOLOGY | Facility: MEDICAL CENTER | Age: 57
End: 2022-10-06

## 2022-10-17 RX ORDER — MELOXICAM 7.5 MG/1
7.5 TABLET ORAL DAILY
Qty: 90 TABLET | Refills: 3 | Status: SHIPPED | OUTPATIENT
Start: 2022-10-17 | End: 2022-10-21 | Stop reason: SDUPTHER

## 2022-10-21 DIAGNOSIS — I10 HYPERTENSION, UNSPECIFIED TYPE: ICD-10-CM

## 2022-10-21 RX ORDER — MELOXICAM 7.5 MG/1
7.5 TABLET ORAL DAILY
Qty: 90 TABLET | Refills: 3 | Status: SHIPPED | OUTPATIENT
Start: 2022-10-21 | End: 2023-04-28 | Stop reason: SDUPTHER

## 2022-10-21 RX ORDER — CYCLOBENZAPRINE HCL 5 MG
5 TABLET ORAL 3 TIMES DAILY PRN
Qty: 90 TABLET | Refills: 0 | Status: SHIPPED | OUTPATIENT
Start: 2022-10-21 | End: 2023-01-06 | Stop reason: SDUPTHER

## 2022-10-21 RX ORDER — LOSARTAN POTASSIUM 50 MG/1
50 TABLET ORAL DAILY
Qty: 90 TABLET | Refills: 3 | Status: SHIPPED | OUTPATIENT
Start: 2022-10-21 | End: 2023-01-06 | Stop reason: SDUPTHER

## 2023-01-06 ENCOUNTER — TELEPHONE (OUTPATIENT)
Dept: MEDICAL GROUP | Facility: CLINIC | Age: 58
End: 2023-01-06
Payer: COMMERCIAL

## 2023-01-06 DIAGNOSIS — I10 HYPERTENSION, UNSPECIFIED TYPE: ICD-10-CM

## 2023-01-06 NOTE — TELEPHONE ENCOUNTER
Hi,    Patient called this morning and needs prescriptions refilled.  She said she has called multiple times and that the pharmacy has also been trying to reach out as well. Patient said she is completely out of meds now.     It is for  her arthritis prescription (cyclelobensaprine:  Patient wasn't sure how to spell     And her BP prescription.    Patient would like this filled as soon as possible.    Thank you!

## 2023-01-09 RX ORDER — LOSARTAN POTASSIUM 50 MG/1
50 TABLET ORAL DAILY
Qty: 90 TABLET | Refills: 3 | Status: SHIPPED | OUTPATIENT
Start: 2023-01-09 | End: 2023-01-13 | Stop reason: SDUPTHER

## 2023-01-09 RX ORDER — CYCLOBENZAPRINE HCL 5 MG
5 TABLET ORAL 3 TIMES DAILY PRN
Qty: 90 TABLET | Refills: 0 | Status: SHIPPED | OUTPATIENT
Start: 2023-01-09 | End: 2023-02-16 | Stop reason: SDUPTHER

## 2023-01-09 RX ORDER — AMLODIPINE BESYLATE 5 MG/1
5 TABLET ORAL DAILY
Qty: 90 TABLET | Refills: 3 | Status: SHIPPED | OUTPATIENT
Start: 2023-01-09 | End: 2023-09-15

## 2023-02-21 RX ORDER — CYCLOBENZAPRINE HCL 5 MG
5 TABLET ORAL 3 TIMES DAILY PRN
Qty: 90 TABLET | Refills: 0 | Status: SHIPPED | OUTPATIENT
Start: 2023-02-21 | End: 2023-03-23

## 2023-03-23 RX ORDER — CYCLOBENZAPRINE HCL 5 MG
TABLET ORAL
Qty: 90 TABLET | Refills: 0 | Status: SHIPPED | OUTPATIENT
Start: 2023-03-23 | End: 2023-04-28 | Stop reason: SDUPTHER

## 2023-06-30 ENCOUNTER — APPOINTMENT (OUTPATIENT)
Dept: URGENT CARE | Facility: CLINIC | Age: 58
End: 2023-06-30
Payer: COMMERCIAL

## 2023-07-01 ENCOUNTER — OFFICE VISIT (OUTPATIENT)
Dept: URGENT CARE | Facility: CLINIC | Age: 58
End: 2023-07-01
Payer: COMMERCIAL

## 2023-07-01 VITALS
SYSTOLIC BLOOD PRESSURE: 138 MMHG | TEMPERATURE: 98 F | BODY MASS INDEX: 23.32 KG/M2 | OXYGEN SATURATION: 99 % | RESPIRATION RATE: 18 BRPM | DIASTOLIC BLOOD PRESSURE: 84 MMHG | WEIGHT: 138 LBS | HEART RATE: 94 BPM

## 2023-07-01 DIAGNOSIS — R20.0 HAND NUMBNESS: ICD-10-CM

## 2023-07-01 DIAGNOSIS — M25.50 ARTHRALGIA, UNSPECIFIED JOINT: ICD-10-CM

## 2023-07-01 DIAGNOSIS — M79.642 PAIN IN BOTH HANDS: ICD-10-CM

## 2023-07-01 DIAGNOSIS — M79.641 PAIN IN BOTH HANDS: ICD-10-CM

## 2023-07-01 PROCEDURE — 3079F DIAST BP 80-89 MM HG: CPT | Performed by: PHYSICIAN ASSISTANT

## 2023-07-01 PROCEDURE — 99204 OFFICE O/P NEW MOD 45 MIN: CPT | Performed by: PHYSICIAN ASSISTANT

## 2023-07-01 PROCEDURE — 3075F SYST BP GE 130 - 139MM HG: CPT | Performed by: PHYSICIAN ASSISTANT

## 2023-07-01 RX ORDER — CYCLOBENZAPRINE HCL 5 MG
5-10 TABLET ORAL 3 TIMES DAILY PRN
Qty: 15 TABLET | Refills: 0 | Status: SHIPPED | OUTPATIENT
Start: 2023-07-01 | End: 2023-07-03

## 2023-07-01 RX ORDER — METHYLPREDNISOLONE 4 MG/1
TABLET ORAL
Qty: 21 TABLET | Refills: 0 | Status: SHIPPED | OUTPATIENT
Start: 2023-07-01 | End: 2023-07-07

## 2023-07-01 ASSESSMENT — FIBROSIS 4 INDEX: FIB4 SCORE: 0.47

## 2023-07-01 NOTE — PROGRESS NOTES
Subjective:   Courtney Gupta is a 57 y.o. female who presents for Numbness (X2wks, Hand numbness and pain mostly right arm and shoulder, swelling, hot, numbness in both hands, random tendon pain )      HPI  The patient presents to the Urgent Care with a primary complaint of hand numbness and pain for about 2 weeks.  History of RA managed with NSAIDs and cyclobenzaprine.  She is a  and does a lot of lifting and gripping.  Will have chronic intermittent joint pain such as shoulder pain, neck pain, back pain.  Pain radiation from her wrist to her elbow as well.  Pain is worse to her right hand and wrist worse with gripping.   strength weak to the right hand.  Associated tingling.  Denies any recent illness, fevers, chills.    The numbness and tingling is located to her first 3 fingers and a half of her fourth finger.      Past Medical History:   Diagnosis Date    Anxiety     Hypertension      Allergies   Allergen Reactions    Other Drug      Narcotics make her sick        Objective:     /84 (BP Location: Left arm, Patient Position: Sitting, BP Cuff Size: Adult)   Pulse 94   Temp 36.7 °C (98 °F) (Temporal)   Resp 18   Wt 62.6 kg (138 lb)   SpO2 99%   BMI 23.32 kg/m²     Physical Exam  Vitals reviewed.   Constitutional:       General: She is not in acute distress.     Appearance: Normal appearance. She is not ill-appearing or toxic-appearing.   Eyes:      Conjunctiva/sclera: Conjunctivae normal.   Neck:      Comments: Negative Spurlings.   Cardiovascular:      Rate and Rhythm: Normal rate.   Pulmonary:      Effort: Pulmonary effort is normal.   Musculoskeletal:      Right shoulder: No swelling, effusion or crepitus. Normal range of motion.      Left shoulder: No swelling, effusion or crepitus. Normal range of motion.      Right upper arm: Normal. No swelling.      Left upper arm: Normal. No swelling.      Right elbow: Normal.      Left elbow: Normal.      Cervical back: Neck supple.       Comments: Positive tinels at the wrist bilaterally. Decreased right sided  strength compared to left. No obvious thenar atrophy comparing both sides. Sensation intact. Pulses intact. Ganglion cyst to right volar wrist (chronic).    Skin:     General: Skin is warm and dry.   Neurological:      General: No focal deficit present.      Mental Status: She is alert and oriented to person, place, and time.   Psychiatric:         Mood and Affect: Mood normal.         Behavior: Behavior normal.         Diagnosis and associated orders:     1. Hand numbness    - Referral to Orthopedics  - methylPREDNISolone (MEDROL DOSEPAK) 4 MG Tablet Therapy Pack; Follow schedule on package instructions.  Dispense: 21 Tablet; Refill: 0    2. Pain in both hands    - Referral to Orthopedics  - methylPREDNISolone (MEDROL DOSEPAK) 4 MG Tablet Therapy Pack; Follow schedule on package instructions.  Dispense: 21 Tablet; Refill: 0    3. Arthralgia, unspecified joint    - cyclobenzaprine (FLEXERIL) 5 mg tablet; Take 1-2 Tablets by mouth 3 times a day as needed for Moderate Pain or Muscle Spasms.  Dispense: 15 Tablet; Refill: 0     Comments/MDM:     Patient's presenting symptoms and exam findings consistent most likely with carpal tunnel bilaterally.  She also might be having a RA flareup.  Discussed treatment of bilateral wrist brace for rest, and support.  Elevation, ice application.  Medrol Dosepak.  Tylenol.  Patient requesting refill of cyclobenzaprine which helps with her RA.  Follow up with Ortho.        I personally reviewed prior external notes and test results pertinent to today's visit. Pathogenesis of diagnosis discussed including typical length and natural progression. Supportive care, natural history, differential diagnoses, and indications for immediate follow-up discussed. Patient expresses understanding and agrees to plan. Patient denies any other questions or concerns.     Follow-up with the primary care physician for recheck,  reevaluation, and consideration of further management.    Please note that this dictation was created using voice recognition software. I have made a reasonable attempt to correct obvious errors, but I expect that there are errors of grammar and possibly content that I did not discover before finalizing the note.    This note was electronically signed by Saroj Gay PA-C

## 2023-07-03 RX ORDER — CYCLOBENZAPRINE HCL 5 MG
TABLET ORAL
Qty: 15 TABLET | Refills: 0 | Status: SHIPPED | OUTPATIENT
Start: 2023-07-03 | End: 2023-07-07 | Stop reason: SDUPTHER

## 2023-07-07 ENCOUNTER — TELEMEDICINE (OUTPATIENT)
Dept: MEDICAL GROUP | Facility: CLINIC | Age: 58
End: 2023-07-07
Payer: COMMERCIAL

## 2023-07-07 DIAGNOSIS — M25.50 ARTHRALGIA, UNSPECIFIED JOINT: ICD-10-CM

## 2023-07-07 DIAGNOSIS — Z12.12 SCREENING FOR COLORECTAL CANCER: ICD-10-CM

## 2023-07-07 DIAGNOSIS — Z13.0 SCREENING FOR DEFICIENCY ANEMIA: ICD-10-CM

## 2023-07-07 DIAGNOSIS — Z11.59 ENCOUNTER FOR HEPATITIS C SCREENING TEST FOR LOW RISK PATIENT: ICD-10-CM

## 2023-07-07 DIAGNOSIS — Z12.31 ENCOUNTER FOR SCREENING MAMMOGRAM FOR BREAST CANCER: ICD-10-CM

## 2023-07-07 DIAGNOSIS — Z12.11 SCREENING FOR COLORECTAL CANCER: ICD-10-CM

## 2023-07-07 DIAGNOSIS — G56.03 BILATERAL CARPAL TUNNEL SYNDROME: ICD-10-CM

## 2023-07-07 DIAGNOSIS — Z13.1 ENCOUNTER FOR SCREENING EXAMINATION FOR INTERMEDIATE HYPERGLYCEMIA AND DIABETES MELLITUS: ICD-10-CM

## 2023-07-07 PROCEDURE — 99213 OFFICE O/P EST LOW 20 MIN: CPT | Mod: GT,GE | Performed by: STUDENT IN AN ORGANIZED HEALTH CARE EDUCATION/TRAINING PROGRAM

## 2023-07-07 RX ORDER — CYCLOBENZAPRINE HCL 5 MG
5 TABLET ORAL 2 TIMES DAILY PRN
Qty: 60 TABLET | Refills: 2 | Status: SHIPPED | OUTPATIENT
Start: 2023-07-07 | End: 2023-09-15

## 2023-07-07 RX ORDER — MELOXICAM 15 MG/1
15 TABLET ORAL DAILY
Qty: 30 TABLET | Refills: 2 | Status: SHIPPED | OUTPATIENT
Start: 2023-07-07 | End: 2023-10-04

## 2023-07-07 ASSESSMENT — PATIENT HEALTH QUESTIONNAIRE - PHQ9: CLINICAL INTERPRETATION OF PHQ2 SCORE: 0

## 2023-07-07 NOTE — PROGRESS NOTES
Virtual Visit: Established Patient   This visit was conducted via Zoom using secure and encrypted videoconferencing technology.   The patient was in their home in the [facility].    The patient's identity was confirmed and verbal consent was obtained for this virtual visit.    Subjective:   CC:   Chief Complaint   Patient presents with    Follow-Up    Medication Refill     Cyclobenzaprine      Courtney Gupta is a 57 y.o. female presenting for evaluation and management of:    Has lost a lot of weight. Have been working out. Eating well. No longer traveling, currently working here in town.     #hand pain  Right hand swelling with numbness. PA at Urgent Care thought she had carpal tunnel syndrome and bursitis. Finished steroid pack. Feeling better after steroid pack. Hand swelling gone after steroid pack.    #hypertension  Has improved at home. No longer taking amlodipine. High 120's/high 80's low 90's at home.  She is interested and discontinuing her antihypertensive medication.  She states that she has lost a lot of weight and has been working out.  She has also improved diet.    ROS   Negative unless listed in HPI.     Current medicines (including changes today)  Current Outpatient Medications   Medication Sig Dispense Refill    meloxicam (MOBIC) 15 MG tablet Take 1 Tablet by mouth every day. 30 Tablet 2    cyclobenzaprine (FLEXERIL) 5 mg tablet Take 1 Tablet by mouth 2 times a day as needed for Muscle Spasms or Moderate Pain. 60 Tablet 2    losartan (COZAAR) 50 MG Tab Take 1 Tablet by mouth every day. 90 Tablet 3    amLODIPine (NORVASC) 5 MG Tab Take 1 Tablet by mouth every day. (Patient not taking: Reported on 7/1/2023) 90 Tablet 3     No current facility-administered medications for this visit.       Patient Active Problem List    Diagnosis Date Noted    Palpitations 07/28/2022    Vaginal prolapse 07/28/2022    Chronic fatigue 12/01/2021    Chronic nonintractable headache 12/01/2021    Polyarthritis  11/02/2021    Hypertension 11/02/2021        Objective:   There were no vitals taken for this visit.    Physical Exam:  Constitutional: Alert, no distress, well-groomed.  Skin: No rashes in visible areas.  Eye: Round. Conjunctiva clear, lids normal. No icterus.   ENMT: Lips pink without lesions, good dentition, moist mucous membranes. Phonation normal.  Neck: No masses, no thyromegaly. Moves freely without pain.  Respiratory: Unlabored respiratory effort, no cough or audible wheeze  Psych: Alert and oriented x3, normal affect and mood.     Assessment and Plan:   The following treatment plan was discussed:     #Arthralgia  This is a chronic issue.  Was previously on 7.5 mg of meloxicam and was doing well on this but has had some breakthrough pain.  Has been on Flexeril for some time.  Discussed not using Flexeril in the long-term as this can be addictive and cause other issues.  Discussed decreasing dose over time and having her establish with pain medicine.  She is agreeable to this.    #Essential hypertension  Chronic, poorly controlled.  Blood pressures have been in the 130s to high 80s/low 90s at home.  She is wanting to stop her antihypertensive medication.  Discussed risks of increased blood pressure and recommendations to continue blood pressure medication.  She is agreeable to this.    1. Arthralgia, unspecified joint  - Referral to Hand Surgery  - meloxicam (MOBIC) 15 MG tablet; Take 1 Tablet by mouth every day.  Dispense: 30 Tablet; Refill: 2  - cyclobenzaprine (FLEXERIL) 5 mg tablet; Take 1 Tablet by mouth 2 times a day as needed for Muscle Spasms or Moderate Pain.  Dispense: 60 Tablet; Refill: 2    2. Encounter for screening mammogram for breast cancer  - MA-SCREENING MAMMO BILAT W/TOMOSYNTHESIS W/CAD; Future    3. Screening for colorectal cancer  - Referral to GI for Colonoscopy    4. Encounter for hepatitis C screening test for low risk patient  - HEP C VIRUS ANTIBODY; Future    5. Bilateral carpal  tunnel syndrome  - Referral to Hand Surgery    6. Encounter for screening examination for intermediate hyperglycemia and diabetes mellitus  - Comp Metabolic Panel; Future    7. Screening for deficiency anemia  - CBC WITH DIFFERENTIAL; Future      Follow-up: Return for Follow-up and blood pressure check.  In person

## 2023-07-31 DIAGNOSIS — M25.50 ARTHRALGIA, UNSPECIFIED JOINT: ICD-10-CM

## 2023-07-31 DIAGNOSIS — M13.0 POLYARTHRITIS: ICD-10-CM

## 2023-07-31 DIAGNOSIS — G89.29 OTHER CHRONIC PAIN: ICD-10-CM

## 2023-08-01 NOTE — PROGRESS NOTES
Patient with a history of chronic pain.  Requesting referral to pain management.  Referral placed.

## 2023-08-15 ENCOUNTER — HOSPITAL ENCOUNTER (OUTPATIENT)
Dept: LAB | Facility: MEDICAL CENTER | Age: 58
End: 2023-08-15
Attending: STUDENT IN AN ORGANIZED HEALTH CARE EDUCATION/TRAINING PROGRAM
Payer: COMMERCIAL

## 2023-08-15 DIAGNOSIS — Z13.0 SCREENING FOR DEFICIENCY ANEMIA: ICD-10-CM

## 2023-08-15 DIAGNOSIS — Z11.59 ENCOUNTER FOR HEPATITIS C SCREENING TEST FOR LOW RISK PATIENT: ICD-10-CM

## 2023-08-15 DIAGNOSIS — Z13.1 ENCOUNTER FOR SCREENING EXAMINATION FOR INTERMEDIATE HYPERGLYCEMIA AND DIABETES MELLITUS: ICD-10-CM

## 2023-09-15 ENCOUNTER — OFFICE VISIT (OUTPATIENT)
Dept: MEDICAL GROUP | Facility: CLINIC | Age: 58
End: 2023-09-15
Payer: COMMERCIAL

## 2023-09-15 VITALS
HEART RATE: 84 BPM | BODY MASS INDEX: 23.05 KG/M2 | SYSTOLIC BLOOD PRESSURE: 132 MMHG | OXYGEN SATURATION: 97 % | HEIGHT: 64 IN | TEMPERATURE: 97.8 F | DIASTOLIC BLOOD PRESSURE: 86 MMHG | WEIGHT: 135 LBS

## 2023-09-15 DIAGNOSIS — N20.0 RECURRENT KIDNEY STONES: ICD-10-CM

## 2023-09-15 DIAGNOSIS — E07.9 THYROID LUMP: ICD-10-CM

## 2023-09-15 DIAGNOSIS — R13.10 DIFFICULTY SWALLOWING SOLIDS: ICD-10-CM

## 2023-09-15 PROCEDURE — 3075F SYST BP GE 130 - 139MM HG: CPT | Performed by: STUDENT IN AN ORGANIZED HEALTH CARE EDUCATION/TRAINING PROGRAM

## 2023-09-15 PROCEDURE — 99213 OFFICE O/P EST LOW 20 MIN: CPT | Mod: GE | Performed by: STUDENT IN AN ORGANIZED HEALTH CARE EDUCATION/TRAINING PROGRAM

## 2023-09-15 PROCEDURE — 3079F DIAST BP 80-89 MM HG: CPT | Performed by: STUDENT IN AN ORGANIZED HEALTH CARE EDUCATION/TRAINING PROGRAM

## 2023-09-15 RX ORDER — CYCLOBENZAPRINE HCL 5 MG
5 TABLET ORAL
Qty: 30 TABLET | Refills: 0 | Status: SHIPPED | OUTPATIENT
Start: 2023-09-15 | End: 2023-10-02 | Stop reason: SDUPTHER

## 2023-09-15 ASSESSMENT — FIBROSIS 4 INDEX: FIB4 SCORE: 0.47

## 2023-09-15 NOTE — PROGRESS NOTES
"Subjective:     CC: lump, med refill     HPI:   Courtney presents today with below complaints.     #medication refill  Presenting for refill of Flexeril today. She states she does have an appointment with the pain management group this month but is requesting refill to cover until that time. She states some days she does not have to take this medication and others she has to take a couple.     #lump on throat  Has had difficulty swallowing for the last 5 months. She reports difficulty swallowing with solids only, no fluids except for some soup. The difficulty swallowing will typically last a couple of minutes. Round lump on left side, denies any pain. No increase in size that she is aware of over the last 3 months. No history of thyroid issues.     #referral request  Patient states history of recurrent kidney stones. Continues to have them. Would like referral to urology for additional evaluation and discussion. Have been occurring since her teenage years.     Colonoscopy scheduled on October 13th. Patient reports history of crohn's.     No problems updated.    Current Outpatient Medications Ordered in Epic   Medication Sig Dispense Refill    meloxicam (MOBIC) 15 MG tablet Take 1 Tablet by mouth every day. 30 Tablet 2    cyclobenzaprine (FLEXERIL) 5 mg tablet Take 1 Tablet by mouth 2 times a day as needed for Muscle Spasms or Moderate Pain. 60 Tablet 2    losartan (COZAAR) 50 MG Tab Take 1 Tablet by mouth every day. 90 Tablet 3     No current Epic-ordered facility-administered medications on file.     ROS:  Review of Systems   All other systems reviewed and are negative.      Objective:     Exam:  /86   Pulse 84   Temp 36.6 °C (97.8 °F)   Ht 1.626 m (5' 4\")   Wt 61.2 kg (135 lb)   SpO2 97%   BMI 23.17 kg/m²  Body mass index is 23.17 kg/m².    Physical Exam  Vitals and nursing note reviewed.   Constitutional:       General: She is not in acute distress.     Appearance: Normal appearance. She is not " ill-appearing or toxic-appearing.   HENT:      Head: Normocephalic and atraumatic.      Nose: Nose normal.      Mouth/Throat:      Mouth: Mucous membranes are moist.      Pharynx: No oropharyngeal exudate or posterior oropharyngeal erythema.   Eyes:      Extraocular Movements: Extraocular movements intact.      Conjunctiva/sclera: Conjunctivae normal.   Neck:      Thyroid: Thyroid mass present. No thyroid tenderness.   Cardiovascular:      Rate and Rhythm: Normal rate and regular rhythm.      Heart sounds: No murmur heard.  Pulmonary:      Effort: Pulmonary effort is normal. No respiratory distress.      Breath sounds: Normal breath sounds. No wheezing.   Abdominal:      General: There is no distension.   Musculoskeletal:         General: No deformity. Normal range of motion.      Cervical back: Normal range of motion and neck supple.   Lymphadenopathy:      Cervical: No cervical adenopathy.   Skin:     General: Skin is warm.      Findings: No rash.   Neurological:      General: No focal deficit present.      Mental Status: She is alert.   Psychiatric:         Mood and Affect: Mood normal.         Behavior: Behavior normal.         Thought Content: Thought content normal.         Judgment: Judgment normal.           Assessment & Plan:     57 y.o. female with the following -     #thyroid lump  Has been present for the last 3 months.  5-month history of intermittent issues with swallowing, has occurred a couple of times where she feels like she is choking.  This is happening with solids and 1 time with soup.  She denies any personal or family history of thyroid issues or thyroid cancer.  She overall otherwise feels well.  On exam, there is a welcome circumscribes mass on the left lateral thyroid that does move with swallowing.  Nontender to palpation.  -TSH with reflex T4 and CBC  -Thyroid ultrasound, biopsy pending results    #recurrent kidney stones  Has been issue since her teenage years.  Recently passed a kidney  stone.  Previously followed with urology.  Does request referral to urology for further evaluation management today.  No symptoms currently  -Referral to urology    #chronic pain  Was on Flexeril 3 times daily as needed and was taking max dose.  Has been weaned over time.  Is occasionally having days where she is not requiring any and other days where she was requiring multiple.  She does lift dogs for work.  She does have an appointment scheduled with PM&R this month.  Is requesting refill today.  Discussed with patient that Flexeril is typically not used in the long-term and will refill 1 more time to bridge her to her appointment however to decrease to once daily as needed only.  She verbalized understanding.  -Appointment with physical medicine and rehab    Problem List Items Addressed This Visit    None  Visit Diagnoses       Recurrent kidney stones        Relevant Orders    Referral to Urology    Thyroid lump        Relevant Orders    US-THYROID    TSH WITH REFLEX TO FT4    CBC WITH DIFFERENTIAL            I spent a total of 45 minutes with record review, exam, communication with the patient, communication with other providers, and documentation of this encounter.      Return for lab follow-up.      Gale Davis MD  [facility] PGY-3

## 2023-09-18 NOTE — PROGRESS NOTES
"Urogynecology and Pelvic Reconstructive Surgery Consultation Visit    Coutrney Gupta MRN:[id number] :1965    Referred by: Gale Davis MD    Reason for Visit:   Chief Complaint   Patient presents with    New Patient         Subjective     History of Presenting Illness:    Courtney Gupta is a 57 y.o. year old P3 who was referred by Dr. Davis for the evaluation and management of prolapse.      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  She has a splint on her perineum and noted to have bowel movements frequently    She also gets kidney stones and was referred to urology at her recent PCP visit (9/15)    She has suspected Crohn's, not confirmed. Also has UC, just takes omeprazole, no immusuppression.       Prior Pelvic surgery:   2005 abdominal hysterectomy, no mesh  Multiple other laparotomies for fibroids/cysts, unclear  Cholecystectomy     Prior treatment:   Kegels and online pelvic PT       Pelvic floor symptom review:     Bladder:   Voids per day: 2-3 Voids per night: 1      Urinary incontinence episodes per day: non e   Bladder emptying: Complete   Voiding symptoms: None   UTI in last 12 months: None   Other urologic history: Stones      Prolapse:     Prolapse symptoms: Bulge, Exteriorized Tissue, and Pelvic Pressure   Degree of prolapse: Beyond Introitus   Duration of prolapse symptoms: 8 months      Bowel:    Constipation: Yes - not too bothersome   Bowel movements per day: 1-2    Straining to empty bowels: Yes   Splinting to evacuate: Yes   Painful evacuation: Yes   Difficulty emptying rectum: No    Incontinence to stool: No   Blood in stool: No    Hemorrhoids: No    Bowel conditions: Ulcerative Colitis   Most recent colonoscopy: scheduled 10/2023       Sexual function:    Sexually active: No  - just due relations   Gender of partners: Male   Pain with " intercourse: No            Past medical and surgical history    Past obstetric history   Number of vaginal deliveries: 3   Number of  deliveries: 0   History of vacuum/forceps: No    History of obstetric anal sphincter injury: Yes    Past gynecological history:    Last menstrual period: No LMP recorded. Patient has had a hysterectomy.       Past medical history:  Past Medical History:   Diagnosis Date    Hypertension      Past surgical history:  Past Surgical History:   Procedure Laterality Date    CHOLECYSTECTOMY      LUMPECTOMY      VAGINAL HYSTERECTOMY TOTAL      Hysterectomy,Total Vaginal     Medications:has a current medication list which includes the following prescription(s): estradiol, cyclobenzaprine, meloxicam, and losartan.  Allergies:Other drug  Family history:  Family History   Problem Relation Age of Onset    No Known Problems Mother     Heart Disease Father     Diabetes Father      Social history: reports that she has never smoked. She has never used smokeless tobacco. She reports that she does not currently use alcohol. She reports current drug use. Drug: Marijuana.    Review of systems: A full review of systems was performed, and negative with the exception of want is noted above in the HPI.        Objective        /81   Wt 135 lb   BMI 23.17 kg/m²     Physical Exam  Vitals reviewed. Exam conducted with a chaperone present (MA - see notes.).   Constitutional:       Appearance: Normal appearance.   HENT:      Head: Normocephalic.      Mouth/Throat:      Mouth: Mucous membranes are moist.   Cardiovascular:      Rate and Rhythm: Normal rate.   Pulmonary:      Effort: Pulmonary effort is normal.   Abdominal:      Palpations: Abdomen is soft. There is no mass.      Tenderness: There is no abdominal tenderness.   Skin:     General: Skin is warm and dry.   Neurological:      Mental Status: She is alert.   Psychiatric:         Mood and Affect: Mood normal.         Genitourinary:    External  female genitalia: WNL   Vulva: WNL   Bulbocavernosus reflex: Intact   Anal wink reflex: Intact   Perineal sensation: WNL   Urethra: Atrophic   Vagina: Atrophic   Atrophy: Moderate   Cough stress test: Negative    Pelvic floor:    POP-Q: Aa -1.5 / Ba -1.5 / TVL 8 / C -4 / D X / Ap 0 / Bp 0 / GH 4.5 / PB 2   Rectovaginal examination with a palpable distal rectocele and perineocele which is exacerbated with Valsalva.  Attenuated perineal body.   Prolapse stage: 2  Urethral tenderness: No    Bladder/ suprapubic tenderness: No    Levator tenderness: None   Levator muscle tone: WNL   Pelvic floor contraction strength (modified Oxford scale): 2=Weak   Pelvic floor contraction duration: Brief    Bimanual exam: no masses   Anal resting tone: WNL   Anal squeeze tone: Decreased   Palpable anal sphincter defect: No       Procedure Performed: No    Diagnostic test and records review:     Post-void residual: 6 mL, performed by Bladder Scanner    Labs: n/a    Radiology: n/a    Documentation reviewed: Prior EMR Records           Assessment & Plan     Courtney Gupta is a 57 y.o. year old P3 with prolapse. We discussed my recommendations for further diagnosis and treatment at length today.     1. Rectocele  2. Perineocele  3. Vaginal vault prolapse  4. Outlet dysfunction constipation  Ms. Gupta has symptomatic pelvic organ prolapse, posterior predominant. I reviewed the clinical findings and discussed the pathogenesis extensively, including genetic tendency, aging, menopause and childbirth injuries. Also discussed options for management, including both nonsurgical and surgical options.   Nonsurgical options include both expectant management and pessary use.due to the distal nature of her rectocele and perineocele, pessary is less likely to resolve her symptoms.  Additionally, she would prefer definitive management.    I discussed with her both laparoscopic mesh augmented as well as vaginal approaches.  Due to her extensive  abdominal surgical history and likelihood of scar tissue, and isolated posterior compartment prolapse with only slight vault prolapse, I think she would be best served by a vaginal approach extraperitoneally in order to reduce her rectocele and potentially elevate the vault.  While there is a somewhat elevated recurrence risk compared to mesh augmented repair, this is likely the safest approach and has a high likelihood of improving her symptoms.  She understands the goals of surgery are to reduce the vaginal vault to make it easier for her to have a bowel movement.  Bowel emptying usually improves in 80% of patients, but often further physical therapy is needed after surgery.  She like to book for surgery via: Pelvic exam under anesthesia, posterior pair, perineorrhaphy, possible vault suspension, and all indicated procedures.  Understands that vault suspension may incur some buttock pain during healing due to use of sacrospinous ligament  In addition to verbal counseling, written counseling was provided for her review prior to her preoperative visit.  All questions answered to her satisfaction.  No preoperative bladder testing required due to her lack of anterior prolapse as well as no urinary symptoms.          5. Atrophic vaginitis  Her exam confirms vaginal atrophy / genitorurinary syndrome of menopause. This is very common and due to low estrogen levels, which render the vaginal tissue thin, irritated, and open to colonization with gut aron. This can lead to irritation, dryness, painful sex, urinary infections and urinary urgency. Discussed risks, benefits, and indications for vaginal estrogen therapy.  Vaginal estrogen has negligible absorption into the bloodstream and is not associated with increased risks for uterine or breast cancers. The effects of vaginal estrogen can take weeks to months.    - estradiol (ESTRACE VAGINAL) 0.1 MG/GM vaginal cream; Apply 1g cream inside vagina twice per week  Dispense: 1  Each; Refill: 3             Santosh Maher MD, FACOG  Urogynecology and Pelvic Reconstructive Surgery  Department of Obstetrics and Gynecology  [facility]        This medical record contains text that has been entered with the assistance of computer voice recognition and dictation software.  Therefore, it may contain unintended errors in text, spelling, punctuation, or grammar

## 2023-09-19 ENCOUNTER — GYNECOLOGY VISIT (OUTPATIENT)
Dept: GYNECOLOGY | Facility: CLINIC | Age: 58
End: 2023-09-19
Payer: COMMERCIAL

## 2023-09-19 VITALS — DIASTOLIC BLOOD PRESSURE: 81 MMHG | BODY MASS INDEX: 23.17 KG/M2 | WEIGHT: 135 LBS | SYSTOLIC BLOOD PRESSURE: 138 MMHG

## 2023-09-19 DIAGNOSIS — N81.9 VAGINAL VAULT PROLAPSE: ICD-10-CM

## 2023-09-19 DIAGNOSIS — N81.81 PERINEOCELE: ICD-10-CM

## 2023-09-19 DIAGNOSIS — N81.6 RECTOCELE: ICD-10-CM

## 2023-09-19 DIAGNOSIS — N95.2 ATROPHIC VAGINITIS: ICD-10-CM

## 2023-09-19 DIAGNOSIS — K59.02 OUTLET DYSFUNCTION CONSTIPATION: ICD-10-CM

## 2023-09-19 PROCEDURE — 99204 OFFICE O/P NEW MOD 45 MIN: CPT | Performed by: STUDENT IN AN ORGANIZED HEALTH CARE EDUCATION/TRAINING PROGRAM

## 2023-09-19 PROCEDURE — 3079F DIAST BP 80-89 MM HG: CPT | Performed by: STUDENT IN AN ORGANIZED HEALTH CARE EDUCATION/TRAINING PROGRAM

## 2023-09-19 PROCEDURE — 3075F SYST BP GE 130 - 139MM HG: CPT | Performed by: STUDENT IN AN ORGANIZED HEALTH CARE EDUCATION/TRAINING PROGRAM

## 2023-09-19 RX ORDER — ESTRADIOL 0.1 MG/G
CREAM VAGINAL
Qty: 1 EACH | Refills: 3 | Status: SHIPPED | OUTPATIENT
Start: 2023-09-19 | End: 2024-02-16 | Stop reason: SDUPTHER

## 2023-09-19 ASSESSMENT — FIBROSIS 4 INDEX: FIB4 SCORE: 0.52

## 2023-09-19 NOTE — PATIENT INSTRUCTIONS
Pre-op: What you should know before your surgery  Vaginal reconstructive surgery for prolapse       You are scheduled for surgery to fix your prolapse (vaginal bulge) using sutures to suspend your own tissues back into a supported position. These surgeries are minimally invasive, meaning that all incisions are small and hidden inside the vagina. The documents in this packet will outline each part of the surgery. There may be a few “possible” surgeries listed. We use the word “possible” because we tailor the surgery based on your needs. This means we won't know how much surgery you'll need until after you receive anesthesia and fall asleep.     When you fall asleep, the pelvic muscles will relax, allowing us to determine how much surgery you need. We will do as few procedures as possible to fix your prolapse but will address each area as needed.     Goals of prolapse surgery: The primary goal of surgery is to repair the prolapse and eliminate the symptoms of a vaginal bulge and pressure. Depending on your symptoms, the surgery may possibly improve bladder and/or rectal emptying, as well as urinary incontinence.      Prolapse recurrence:  No permanent mesh material will be used to fix prolapse in this procedure. However, since we are relying on your own tissues to heal into place and correct the prolapse, there is a risk that your symptoms may return over time. The majority of patients are satisfied with their repair long-term and do not require any further surgery. However, after non-mesh vaginal surgery, prolapse can eventually return in up to half of women. About 10% of women requiring another treatment. Your personal risk of recurrence/retreatment is based on multiple factors including genetics, your body weight, smoking, frequent heavy lifting, and future vaginal deliveries.      Sexual function:  Following surgical repair, most patients experience improvements in their sexual function. Surgery tends to improve  the general discomfort of sex associated with prolapse. However, if you have a long history of pain with sex (either externally or internally), this is unlikely to be due to prolapse. Therefore, surgery may not improve these symptoms.     Surgery involves the cutting and re-sewing of the vaginal tissues . Scar tissue may form after surgery, which can lead to painful sex for some patients. In many patients, this new pain goes away over time or can be improved with pelvic physical therapy, lubrication, dilators, or vaginal estrogen.     Bladder urgency and incontinence after surgery:  Bladder tests may be performed before your surgery to see whether you are at risk for new or worsening urinary leakage after prolapse repair. Our bladder testing is a great tool to find out who is at risk for urinary leakage, but it is not perfect. There is a chance you may have new or increased leakage with coughing, sneezing, or laughing after surgery. Problems with leakage can be addressed in a number of ways. Bladder urgency and/or frequency often improves after surgery, but it may worsen in some patients. We have various medications and therapies that can help with this urgency after surgery, if necessary    Risk of postoperative pain: Pain after prolapse surgery is a normal part of the healing process, but usually well-tolerated. Common areas of pain include the pelvis, buttocks, hips and back, often related to positioning. Expect to have some pain when you are discharged home. This should improve with time and with use of medications. See “after surgery” instructions for more details on pain control.     General risks of pelvic reconstructive surgery: Specific risks for your procedure are discussed separately  Anesthesia: With modern anesthetics and monitoring equipment, complications due to anesthesia are very rare. Your anesthesiologist will discuss what will be most suitable for you on the day of surgery  Bleeding: All surgery  results in bleeding, however this surgery usually amounts to blood loss between a tablespoon and a teacup. Large amounts of blood loss that requires a blood transfusion are not common (only 1-2% of women) in prolapse surgery.  Blood transfusion, if needed, is safe. Minor reactions like fever or allergy occur in less than 1% of transfusions. Risks of an infection or mismatched blood is very rare (less than 1 out of every 100,000 transfusions).   Infection: The most common infection with prolapse surgery is a urinary tract infection (UTI). This is easily treated. Wound infections occur in 2-3% of patients. Rarely, infection of the abdominal/vaginal wounds may occur, or abscesses in the pelvis may develop. These infections may need to be treated with antibiotics or another procedure to fix them. Risk factors for infections and wound complications include diabetes, smoking, obesity, and other chronic illnesses.  Blood Clots: Clots in the blood vessels of the legs and lungs are potentially dangerous and can occur in patients undergoing prolapse surgery. This is prevented with leg compression during surgery, and sometimes, an injected blood thinner. We strongly encourage you to stay mobile because this is an important way to prevent clots.  Damage to surrounding organs. The pelvic organs are all very close together. The risk of damage to other organs increases if you have had prior pelvic surgeries, as well as a history of endometriosis or pelvic infection. These conditions can cause scar tissue to develop in the pelvis. Scar tissue can cause organs to stick together, making the surgery more difficult.    Ureter and bladder damage: The ureters are tubes that carry urine from the kidneys to the bladder. They travel very close to the uterus and vagina. The bladder is attached to both your uterus and the front of the vagina. Damage/injury can happen during prolapse-repair procedures. A cystoscopy (camera in the bladder) will  be done during your surgery to ensure there is no bladder or ureter damage/injury. If injury occurs, it may require temporary placement of a stent (drainage tube). In rare cases, a larger abdominal incision may be needed to repair the injury. A bladder catheter may need to stay in place temporarily after surgery.  Bowel damage: Bowel damage/injury is uncommon during your surgery. Any damage that is seen in surgery will be fixed. Very rarely, a temporary ostomy (connection of bowel to the front of the abdomen and collection of stool with a bag) is required for a higher-risk bowel injury.  Vascular damage: Major damage to blood vessels is uncommon. If this occurs, it may require a larger surgery and/or blood transfusion.  Fistula: A fistula is an abnormal connection between the vagina and either the bladder or rectum. A fistula leads to leakage of urine or stool. These are rare complications that arise during healing from pelvic surgery that sometimes require additional surgeries to correct. We take great care is during your surgery to prevent these fistulas. If they do occur, your Urogynecologist is the leading expert in fixing them.        Main surgical procedure:  Posterior repair, perineorrhaphy  (fix prolapse of the back of the vagina/rectum and pelvic muscles)        The entire procedure will be completed in a minimally invasive manner, with all incisions inside of the vagina. Once you are asleep, a thorough exam will be performed to confirm the areas needing repair. A cut is made along the back wall of the vagina where the rectum is pushing down, and the skin above the rectum is  from the underlying supportive tissue. This stronger tissue underneath is then repaired using sutures that will dissolve over time. Sometimes excess skin is removed. The skin is then closed.     If the area between the vagina and the anus (called the perineum) is weak, then sutures will be placed to make that area stronger.  "This is called a \"perineorrhaphy.\" This will be just like a repair of an episiotomy or a vaginal tear after having a baby.     The risk of these procedures is similar to those mentioned in the “pre-op” leaflet. However, any surgery to the back wall of the vagina carries a higher risk of pain with sexual intercourse after surgery (up to 10%) due to scar formation. Great care is taken not to narrow the vagina more than necessary. The perineorrhaphy (or episiotomy type of repair) can be uncomfortable and may be difficult to sit normally for up to 6 weeks after surgery. You may use a doughnut cushion to sit on if needed.          Possible Procedure:   Apical repair  (fix prolapse of the top of the vagina/uterus)        Often, the pelvic exam in the office is limited by discomfort and pelvic muscle tension. The exam will be repeated in detail when you are relaxed and asleep with anesthesia. At this time, if your surgeon notes that you have significant prolapse of the top (apex) of the vagina, they will proceed with an apical repair. The repair will be done by lifting the top of the vagina with sutures and connecting it to your existing pelvic structures, such as the sacrospinous ligament, uterosacral ligament, and/or iliococcygeus muscle.     In addition to the general surgical risks listed above, this procedure carries the risk of:  Buttock pain: The suture in the ligament sometimes causes an aching buttock discomfort as it heals. This is temporary, and should resolve over a few weeks. Rarely, this leads to severe pain from nerve entrapment, which is repaired on the same day of surgery by removing the suture.  Hematoma (pooling of blood) in the areas behind the vagina. This is due to the many small blood vessels in this area. You may require a vaginal gauze packing in the vagina after the surgery. More rarely, a large blood clot may require another procedure to drain and repair the area.       Post-op: What to expect " after your surgery    For urgent post-operative questions or concerns, please call Dr. Maher's direct on-call cell line at [phone].     For less-urgent matters (Monday - Friday), you may send a message through [employer] or call the general Women's Health line at [phone] [phone].      Recovery room  You can expect to stay in the recovery room for a few hours until you are alert. There may be a gauze packing in your vagina, which will be removed before you go home. Pain is normal after surgery but should be tolerable. Your pain will become less over the first 2 weeks. If your pain is difficult to control or you need more nursing care, you will stay in the hospital overnight. Most patients do very well going home on the day of surgery.     Bladder function  You will wake up with a small tube (catheter) in your bladder. A bladder test, called a “void trial”, will be performed by the nurse before you go home. The test is done to make sure you can empty your bladder normally. To do the bladder test, the nurse will fill your bladder with sterile water, remove the catheter, and give you 30 minutes to try and urinate (pee) on your own. If you cannot urinate, or if you only urinate a small amount, you will need to:   Go home with a catheter that stays in your bladder OR  Learn to put a catheter into your bladder a few times a day to empty it    Use of a catheter is temporary and usually needed for 2-4 days. It is very common for the bladder to work slowly, or sluggishly, after this type of surgery. One in every 3-4 patients will require some type of catheterization. An antibiotic may be prescribed while the catheter is in place to decrease the risk of infection.    Call the surgeon for treatment, if you have signs and symptoms of a urinary tract infection, including:  Burning with urination  Bladder pain  Worsening need to urinate right away,  Urine with a bad smell    Vaginal care  Do not go swimming, take sitting baths,  and have sexual intercourse for 6 weeks after surgery Do not place anything in your vagina except vaginal estrogen cream, if instructed to do so by your surgeon.     Vaginal discharge and bleeding/spotting is also normal through the entire 6-week recovery. Sometimes small sutures will fall out of the vagina as they dissolve. This is normal. Contact the office with any heavy bleeding, foul discharge or worsening pain.     Pain management  Surgical pain is controlled in most patients with only acetaminophen (Tylenol) and non-steroidal anti-inflammatory drugs (NSAIDs), such as ibuprofen (Advil). These drugs can be taken together because they do not interact with each other. Check your prescription for specific dosing instructions. A cold compress can help with pain in the vaginal area.  Sometimes, a short course of narcotics, such as oxycodone or hydromorphone is required. We do not recommend using narcotics regularly as it can lead to constipation or dizziness and falls.     Do not drive or operate heavy machinery while using narcotics. You are unlikely to become addicted if you need to take a narcotic medication a few times within the first week of your surgery.  After the first few days to one week, your pain should decrease and you should not have pain severe enough to need narcotics. If you continue having severe pain, contact your surgeon for re-evaluation.     Bowel function  Constipation is common after surgery. This means it may take several days before having a normal stool, or bowel movement. It is important to take extra steps to keep your stools soft to avoid straining with bowel movements. Straining could damage the prolapse repair before it has healed. Most patients will be given a prescription for a stool softener (docusate) as well as a gentle laxative (Miralax or lactulose). These drugs add water to the stool to make it easier to pass. Take them daily throughout your recovery. Hold for a day if you  develop diarrhea.     Call us if you experience any repeated episodes of vomiting, worsening abdominal pain/bloating, or are unable to have a bowel movement for more than 3 days.      Activity restrictions  During the first 6 weeks, avoid any type of heavy lifting that requires straining.  Gentle walking is good exercise. Start with about 10 minutes a day when you feel ready and build up gradually. Avoid repetitive squatting or bending at the waist. Avoid any fitness-type training, aerobics, etc. for at least 6 weeks after surgery. Generally, you will need 4-6 weeks off work. This period may be longer if you have a very physical job.    Return to sex  When your surgeon clears you to resume sex (if desired), begin slowly and use enough lubrication to help ease the discomfort.  Because your vagina and pelvis have been fixed, or re-structured, it can take time to get used to sex after surgery. As scar tissue softens over time, you will feel less discomfort. If the discomfort does not go away, contact the office to schedule an exam and discuss other options. In some cases, your surgeon may prescribe a low dose of vaginal estrogen to help with vaginal dryness and pain that may happen with sex.

## 2023-09-21 ENCOUNTER — HOSPITAL ENCOUNTER (OUTPATIENT)
Dept: RADIOLOGY | Facility: MEDICAL CENTER | Age: 58
End: 2023-09-21
Attending: STUDENT IN AN ORGANIZED HEALTH CARE EDUCATION/TRAINING PROGRAM
Payer: COMMERCIAL

## 2023-09-21 DIAGNOSIS — E07.9 THYROID LUMP: ICD-10-CM

## 2023-09-21 PROCEDURE — 76536 US EXAM OF HEAD AND NECK: CPT

## 2023-09-25 ENCOUNTER — OFFICE VISIT (OUTPATIENT)
Dept: PHYSICAL MEDICINE AND REHAB | Facility: MEDICAL CENTER | Age: 58
End: 2023-09-25
Payer: COMMERCIAL

## 2023-09-25 VITALS
HEART RATE: 94 BPM | DIASTOLIC BLOOD PRESSURE: 82 MMHG | TEMPERATURE: 97.8 F | BODY MASS INDEX: 23.69 KG/M2 | SYSTOLIC BLOOD PRESSURE: 122 MMHG | OXYGEN SATURATION: 99 % | WEIGHT: 142.2 LBS | HEIGHT: 65 IN

## 2023-09-25 DIAGNOSIS — G47.00 INSOMNIA, UNSPECIFIED TYPE: ICD-10-CM

## 2023-09-25 DIAGNOSIS — R52 DIFFUSE PAIN: ICD-10-CM

## 2023-09-25 DIAGNOSIS — G89.4 CHRONIC PAIN SYNDROME: ICD-10-CM

## 2023-09-25 PROCEDURE — 99204 OFFICE O/P NEW MOD 45 MIN: CPT | Performed by: PHYSICAL MEDICINE & REHABILITATION

## 2023-09-25 PROCEDURE — 3074F SYST BP LT 130 MM HG: CPT | Performed by: PHYSICAL MEDICINE & REHABILITATION

## 2023-09-25 PROCEDURE — 1125F AMNT PAIN NOTED PAIN PRSNT: CPT | Performed by: PHYSICAL MEDICINE & REHABILITATION

## 2023-09-25 PROCEDURE — 3079F DIAST BP 80-89 MM HG: CPT | Performed by: PHYSICAL MEDICINE & REHABILITATION

## 2023-09-25 ASSESSMENT — FIBROSIS 4 INDEX: FIB4 SCORE: 0.52

## 2023-09-25 ASSESSMENT — PAIN SCALES - GENERAL: PAINLEVEL: 4=SLIGHT-MODERATE PAIN

## 2023-09-25 ASSESSMENT — PATIENT HEALTH QUESTIONNAIRE - PHQ9: CLINICAL INTERPRETATION OF PHQ2 SCORE: 0

## 2023-09-25 NOTE — PROGRESS NOTES
New patient note    Interventional spine and Pain  Physiatry (physical medicine and  Rehabilitation)     Date of service: See epic    Chief complaint:   Chief Complaint   Patient presents with    Follow-Up     Polyarthritis/arthralgia        Referring provider: Gale Davis M.D.     HISTORY    HPI: Courtney Gupta 57 y.o.  who presents today with Diagnoses of Chronic pain syndrome, Diffuse pain, and Insomnia were pertinent to this visit.    HPI    Chronic diffuse nonfocal pain involving the entire body.  Denies specific injury.  Denies areas of pain out of proportion to the remainder of the body.  This been present for 4 years.  The patient reports autoimmune work-up has been negative and imaging has been negative.    She is working on improving diet and she is a vegetarian and eats only whole foods and no processed foods.    She has been working on exercise and stress relief and does yoga.    The patient has had no formal physical therapy.  She has not seen chronic pain psychology.    Medications: The patient does get improvement with NSAIDs and acetaminophen.  These provide mild improvement.  She has excellent improvement of pain with Flexeril and when she takes Flexeril 5 mg daily this reduces pain from a 10 out of 10 intensity to a 4 out of 10 intensity allows her to stay functional and do all of her ADLs and exercise.       Medical records review:  I reviewed the note from the referring provider Gale Davis M.D. including the note dated 9/15/2023.           ROS:   Red Flags ROS:   Fever, Chills, Sweats: Denies  Involuntary Weight Loss: Denies  Bladder Incontinence: Denies  Bowel Incontinence: denies  Saddle Anesthesia: Denies    All other systems reviewed and negative.       PMHx:   Past Medical History:   Diagnosis Date    Hypertension          Current Outpatient Medications on File Prior to Visit   Medication Sig Dispense Refill    estradiol (ESTRACE VAGINAL) 0.1 MG/GM vaginal cream Apply 1g  cream inside vagina twice per week 1 Each 3    cyclobenzaprine (FLEXERIL) 5 mg tablet Take 1 Tablet by mouth 1 time a day as needed for Muscle Spasms or Moderate Pain. 30 Tablet 0    meloxicam (MOBIC) 15 MG tablet Take 1 Tablet by mouth every day. 30 Tablet 2    losartan (COZAAR) 50 MG Tab Take 1 Tablet by mouth every day. 90 Tablet 3     No current facility-administered medications on file prior to visit.        PSHx:   Past Surgical History:   Procedure Laterality Date    CHOLECYSTECTOMY      LUMPECTOMY      VAGINAL HYSTERECTOMY TOTAL      Hysterectomy,Total Vaginal       Family history   Family History   Problem Relation Age of Onset    No Known Problems Mother     Heart Disease Father     Diabetes Father          Medications: reviewed on epic.   Outpatient Medications Marked as Taking for the 9/25/23 encounter (Office Visit) with Filiberto Dodd M.D.   Medication Sig Dispense Refill    estradiol (ESTRACE VAGINAL) 0.1 MG/GM vaginal cream Apply 1g cream inside vagina twice per week 1 Each 3    cyclobenzaprine (FLEXERIL) 5 mg tablet Take 1 Tablet by mouth 1 time a day as needed for Muscle Spasms or Moderate Pain. 30 Tablet 0    meloxicam (MOBIC) 15 MG tablet Take 1 Tablet by mouth every day. 30 Tablet 2    losartan (COZAAR) 50 MG Tab Take 1 Tablet by mouth every day. 90 Tablet 3        Allergies:   Allergies   Allergen Reactions    Other Drug      Narcotics make her sick       Social Hx:   Social History     Socioeconomic History    Marital status:      Spouse name: Not on file    Number of children: Not on file    Years of education: Not on file    Highest education level: Not on file   Occupational History    Not on file   Tobacco Use    Smoking status: Never    Smokeless tobacco: Never   Vaping Use    Vaping Use: Never used   Substance and Sexual Activity    Alcohol use: Not Currently    Drug use: Yes     Types: Marijuana    Sexual activity: Not on file   Other Topics Concern    Not on file   Social  "History Narrative    Not on file     Social Determinants of Health     Financial Resource Strain: Not on file   Food Insecurity: Not on file   Transportation Needs: Not on file   Physical Activity: Not on file   Stress: Not on file   Social Connections: Not on file   Intimate Partner Violence: Not on file   Housing Stability: Not on file         EXAMINATION     Physical Exam:   Vitals: /82 (BP Location: Right arm, Patient Position: Sitting, BP Cuff Size: Large adult)   Pulse 94   Temp 36.6 °C (97.8 °F) (Temporal)   Ht 1.638 m (5' 4.5\")   Wt 64.5 kg (142 lb 3.2 oz)   SpO2 99%     Constitutional:   Body Habitus: Body mass index is 24.03 kg/m².  Cooperation: Fully cooperates with exam  Appearance: Well-groomed, well-nourished, not disheveled     Eyes: No scleral icterus to suggest severe liver disease, no proptosis to suggest severe hyperthyroid    ENT -no obvious auditory deficits, no obvious tongue lesions, tongue midline, no facial droop     Skin -no rashes or lesions noted     Respiratory-  breathing comfortable on room air, no audible wheezing    Cardiovascular- capillary refills less than 2 seconds.     Psychiatric- alert and oriented ×3. Normal affect.     Gait - normal gait, no use of ambulatory device, nonantalgic. .     Musculoskeletal and Neuro -       Cervical spine   Inspection: No deformities of the skin over the cervical spine. No rashes or lesions.    full   active range of motion in all directions      Spurling’s sign: negative bilaterally    No signs of muscular atrophy in bilateral upper extremities     No tenderness to palpation of the cervical spine      Key points for the international standards for neurological classification of spinal cord injury (ISNCSCI) to light touch.     Dermatome R L   C4 2 2   C5 2 2   C6 2 2   C7 2 2   C8 2 2   T1 2 2   T2 2 2                                     Motor Exam Upper Extremities   ? Myotome R L   Shoulder flexion C5 5 5   Elbow flexion C5 5 5 "   Wrist extension C6 5 5   Elbow extension C7 5 5   Finger flexion C8 5 5   Finger abduction T1 5 5     Reflexes  ?  R L   Biceps  2+ 2+   Brachioradialis  2+ 2+     Callejas’s sign negative bilaterally            Thoracic/Lumbar Spine/Sacral Spine/Hips   Inspection: No evidence of atrophy in bilateral lower extremities throughout     ROM: full  active range of motion with flexion, lateral flexion, and rotation bilaterally.   There is full  active range of motion with lumbar extension.    There is no pain with facet loading maneuver (extension rotation) with axial low back pain on the BILATERAL side(s)    Palpation:   No tenderness to palpation in midline at T1-T12 levels. No tenderness to palpation in the left and right of the midline T1-L5, NEGATIVE for tenderness to palpation to the para-midline region in the lower lumbar levels.  palpation over SI joint: negative bilaterally    palpation in hip or over the gluteus medius tendon insertion: negative bilaterally      Lumbar spine Special tests  Neuro tension  Straight leg test negative bilaterally    Slump test negative bilaterally      HIP  FAIR test negative bilaterally    Range of motion in the RIGHT hip is full  in flexion, extension, abduction, internal rotation, external rotation.  Range of motion in the LEFT hip is full  in flexion, extension, abduction, internal rotation, external rotation.      SI joint tests  Observation patient sits on one buttocks: Negative  SI joint compression negative bilaterally    SI joint distraction negative bilaterally    Thigh thrust test negative bilaterally    CANELO test negative bilaterally                 Key points for the international standards for neurological classification of spinal cord injury (ISNCSCI) to light touch.     Dermatome R L                                      L2 2 2   L3 2 2   L4 2 2   L5 2 2   S1 2 2   S2 2 2       Motor Exam Lower Extremities    ? Myotome R L   Hip flexion L2 5 5   Knee extension L3 5  "5   Ankle dorsiflexion L4 5 5   Toe extension L5 5 5   Ankle plantarflexion S1 5 5         Reflexes  ?  R L                Patella  2+ 2+   Achilles   2+ 2+       Babinski sign negative bilaterally   Clonus of the ankle negative bilaterally       MEDICAL DECISION MAKING    Medical records review: see under HPI section.     DATA    Labs:   Lab Results   Component Value Date/Time    SODIUM 138 09/04/2022 03:18 PM    SODIUM 137 12/05/2009 04:50 PM    POTASSIUM 3.8 09/04/2022 03:18 PM    POTASSIUM 4.0 12/05/2009 04:50 PM    CHLORIDE 103 09/04/2022 03:18 PM    CHLORIDE 106 12/05/2009 04:50 PM    CO2 24 09/04/2022 03:18 PM    CO2 23 12/05/2009 04:50 PM    ANION 11 09/04/2022 03:18 PM    GLUCOSE 110 (H) 09/04/2022 03:18 PM    GLUCOSE 84 12/05/2009 04:50 PM    BUN 23 (H) 09/04/2022 03:18 PM    BUN 17 12/05/2009 04:50 PM    CREATININE 0.91 09/04/2022 03:18 PM    CREATININE 0.73 12/05/2009 04:50 PM    CALCIUM 10.5 (H) 09/04/2022 03:18 PM    CALCIUM 9.6 12/05/2009 04:50 PM    ASTSGOT 19 09/04/2022 03:18 PM    ASTSGOT 20 12/05/2009 04:50 PM    ALTSGPT 35 09/04/2022 03:18 PM    ALTSGPT 30 12/05/2009 04:50 PM    TBILIRUBIN 0.6 09/04/2022 03:18 PM    TBILIRUBIN 0.7 12/05/2009 04:50 PM    ALBUMIN 4.6 09/04/2022 03:18 PM    ALBUMIN 4.2 12/05/2009 04:50 PM    TOTPROTEIN 8.2 09/04/2022 03:18 PM    TOTPROTEIN 8.0 12/05/2009 04:50 PM    GLOBULIN 3.6 (H) 09/04/2022 03:18 PM    GLOBULIN 3.8 (H) 12/05/2009 04:50 PM    AGRATIO 1.3 09/04/2022 03:18 PM    AGRATIO 1.1 12/05/2009 04:50 PM   ]    No results found for: \"PROTHROMBTM\", \"INR\"     Lab Results   Component Value Date/Time    WBC 7.5 09/15/2023 09:14 AM    WBC 8.8 12/05/2009 04:50 PM    RBC 5.19 09/15/2023 09:14 AM    RBC 4.96 12/05/2009 04:50 PM    HEMOGLOBIN 15.3 09/15/2023 09:14 AM    HEMOGLOBIN 15.1 12/05/2009 04:50 PM    HEMATOCRIT 45.8 09/15/2023 09:14 AM    HEMATOCRIT 42.5 12/05/2009 04:50 PM    MCV 88 09/15/2023 09:14 AM    MCV 85.7 12/05/2009 04:50 PM    MCH 29.5 09/15/2023 " "09:14 AM    MCH 30.5 12/05/2009 04:50 PM    MCHC 33.4 09/15/2023 09:14 AM    MCHC 35.6 (H) 12/05/2009 04:50 PM    MPV 8.1 09/04/2022 03:18 PM    MPV 7.6 12/05/2009 04:50 PM    NEUTSPOLYS 67 09/15/2023 09:14 AM    NEUTSPOLYS 56.6 12/05/2009 04:50 PM    LYMPHOCYTES 25 09/15/2023 09:14 AM    LYMPHOCYTES 36.1 12/05/2009 04:50 PM    MONOCYTES 6 09/15/2023 09:14 AM    MONOCYTES 4.0 12/05/2009 04:50 PM    EOSINOPHILS 1 09/15/2023 09:14 AM    EOSINOPHILS 2.6 12/05/2009 04:50 PM    BASOPHILS 1 09/15/2023 09:14 AM    BASOPHILS 0.7 12/05/2009 04:50 PM        No results found for: \"HBA1C\"     Imaging:   I personally reviewed following images, these are my reads          IMAGING radiology reads. I reviewed the following radiology reads                                                 9/4/2022 CT abdomen pelvis       1. 4 mm right UVJ calculus with mild hydronephrosis and hydroureter.   2. Mild hydronephrosis of the left kidney without stones or discrete cause for   obstruction. Question mild UPJ stricture chronically.   3. Nonobstructing punctate upper pole right renal calculus.                                                         Diagnosis   Visit Diagnoses     ICD-10-CM   1. Chronic pain syndrome  G89.4   2. Diffuse pain  R52   3. Insomnia  G47.00           ASSESSMENT AND PLAN:  Courtney JEREMÍAS Gputa 57 y.o. female      Courtney was seen today for follow-up.    Diagnoses and all orders for this visit:    Chronic pain syndrome    Diffuse pain    Insomnia      The patient is neurologically intact.  There are no signs of spine injuries.    Symptoms are chronic and diffuse with no focal area of pathology to suggest radiculopathy or other injury.  No signs of arthropathy.    This is mostly consistent with central pain syndrome.    I recommend good sleep hygiene    Physical therapy: I recommend physical therapy to focus on strengthening and stretching.     home exercise program: I recommend strengthening and stretching with a home " exercise program     Diagnostic workup: Not indicated    Medications:     The patient has excellent improvement with Flexeril.  I recommend to continue Flexeril 5 mg 3 times daily as needed for pain.    I recommend also consider Elavil which could be helpful for pain.    I recommend for consideration of Cymbalta which could be helpful for pain and sleep    I recommend to titrate off of daily NSAIDs and use NSAIDs only during flares of pain for up to 2 weeks at a time.    Interventional program: not indicated    Referrals:   I recommend referrals for physical therapy, chronic pain psychology        Follow-up: I am referring the patient back to the referring physician and PCP Gale Davis M.D. with the above recommendations          Please note that this dictation was created using voice recognition software. I have made every reasonable attempt to correct obvious errors but there may be errors of grammar and content that I may have overlooked prior to finalization of this note.      Filiberto Dodd MD  Physical Medicine and Rehabilitation  Interventional Spine and Sports Physiatry  [facility]          Gale Davis M.D.

## 2023-09-29 DIAGNOSIS — E04.1 THYROID NODULE: ICD-10-CM

## 2023-09-29 NOTE — PROGRESS NOTES
Reviewed patient thyroid ultrasound. Since nodule is >1.5cm, recommended FNA which was ordered. Attempted to call patient with no answer. Will try to call back to discuss results and next step including FNA biopsy as well as completing labs.

## 2023-10-10 ENCOUNTER — TELEMEDICINE (OUTPATIENT)
Dept: MEDICAL GROUP | Facility: CLINIC | Age: 58
End: 2023-10-10
Payer: COMMERCIAL

## 2023-10-10 DIAGNOSIS — G89.29 OTHER CHRONIC PAIN: ICD-10-CM

## 2023-10-10 PROCEDURE — 99213 OFFICE O/P EST LOW 20 MIN: CPT | Mod: GT,GE | Performed by: STUDENT IN AN ORGANIZED HEALTH CARE EDUCATION/TRAINING PROGRAM

## 2023-10-10 RX ORDER — AMITRIPTYLINE HYDROCHLORIDE 10 MG/1
10 TABLET, FILM COATED ORAL NIGHTLY
Qty: 30 TABLET | Refills: 2 | Status: SHIPPED | OUTPATIENT
Start: 2023-10-10 | End: 2023-10-20

## 2023-10-10 RX ORDER — POLYETHYLENE GLYCOL-3350 AND ELECTROLYTES 236; 6.74; 5.86; 2.97; 22.74 G/274.31G; G/274.31G; G/274.31G; G/274.31G; G/274.31G
POWDER, FOR SOLUTION ORAL
COMMUNITY
Start: 2023-09-13

## 2023-10-10 NOTE — PROGRESS NOTES
Virtual Visit: Established Patient   This visit was conducted via Zoom using secure and encrypted videoconferencing technology.   The patient was in their home in the [facility].    The patient's identity was confirmed and verbal consent was obtained for this virtual visit.    Subjective:   CC:   Chief Complaint   Patient presents with    Medication Management     Courtney Gupta is a 58 y.o. female presenting for evaluation and management of:    #chronic pain  Patient reports that she has been out of her medication for a couple of days and is now having difficulty sleeping secondary to the pain. She says that she did see pain management and he did recommend some other medications that may work well for her and to discuss further about these medications.     #thyroid nodule  Did receive notification about order and how to schedule and is planning to call today to schedule    Does have colonoscopy scheduled for this Friday.     ROS   See HPI.     Current medicines (including changes today)  Current Outpatient Medications   Medication Sig Dispense Refill    GAVILYTE-G 236 g Recon Soln FOLLOW INSTRUCTIONS FROM [employer]. CALL [phone] WITH QUESTIONS.      cyclobenzaprine (FLEXERIL) 5 mg tablet Take 1 Tablet by mouth 1 time a day as needed for Muscle Spasms. 30 Tablet 0    meloxicam (MOBIC) 15 MG tablet Take 1 Tablet by mouth every day. Take 1 pill once daily for 14 days when having flares. 14 Tablet 0    estradiol (ESTRACE VAGINAL) 0.1 MG/GM vaginal cream Apply 1g cream inside vagina twice per week 1 Each 3    losartan (COZAAR) 50 MG Tab Take 1 Tablet by mouth every day. 90 Tablet 3     No current facility-administered medications for this visit.       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    Physical Exam:  Constitutional: Alert, no distress, well-groomed.  Skin: No rashes in visible areas.  Eye: Round. Conjunctiva clear, lids normal. No icterus.   ENMT: Lips pink without lesions, good dentition, moist mucous membranes. Phonation normal.  Neck: No masses, no thyromegaly. Moves freely without pain.  Respiratory: Unlabored respiratory effort, no cough or audible wheeze  Psych: Alert and oriented x3, normal affect and mood.     Assessment and Plan:   The following treatment plan was discussed:     1. Other chronic pain  Chronic issue. Was evaluated by pain management doctor who recommended medical management. Discussed decreased Flexeril to 1 time daily as needed for severe pain or muscle spasms as well as starting Elavil daily to try and reduce pain. She is agreeable to try this. Risks, benefits, side effects of medication discussed with patient.   - amitriptyline (ELAVIL) 10 MG Tab; Take 1 Tablet by mouth every evening.  Dispense: 30 Tablet; Refill: 2    2. Thyroid Nodule  Calling today to scheduling appointment for biopsy after 2 nodules found on thyroid.       Total time spent: 30 minutes.

## 2023-11-08 NOTE — TELEPHONE ENCOUNTER
Received request via: Patient    Was the patient seen in the last year in this department? Yes    Does the patient have an active prescription (recently filled or refills available) for medication(s) requested?  Patient comment: 3 times a day not one    Does the patient have FPC Plus and need 100 day supply (blood pressure, diabetes and cholesterol meds only)? Patient does not have SCP

## 2023-11-09 RX ORDER — CYCLOBENZAPRINE HCL 5 MG
5 TABLET ORAL 3 TIMES DAILY PRN
Qty: 15 TABLET | Refills: 0 | Status: SHIPPED | OUTPATIENT
Start: 2023-11-09 | End: 2023-11-15 | Stop reason: SDUPTHER

## 2023-11-10 ENCOUNTER — APPOINTMENT (OUTPATIENT)
Dept: RADIOLOGY | Facility: MEDICAL CENTER | Age: 58
End: 2023-11-10
Attending: STUDENT IN AN ORGANIZED HEALTH CARE EDUCATION/TRAINING PROGRAM
Payer: COMMERCIAL

## 2023-11-14 ENCOUNTER — APPOINTMENT (OUTPATIENT)
Dept: MEDICAL GROUP | Facility: CLINIC | Age: 58
End: 2023-11-14
Payer: COMMERCIAL

## 2023-11-15 ENCOUNTER — TELEMEDICINE (OUTPATIENT)
Dept: MEDICAL GROUP | Facility: CLINIC | Age: 58
End: 2023-11-15
Payer: COMMERCIAL

## 2023-11-15 DIAGNOSIS — I10 HYPERTENSION, UNSPECIFIED TYPE: ICD-10-CM

## 2023-11-15 DIAGNOSIS — M25.50 ARTHRALGIA, UNSPECIFIED JOINT: Primary | ICD-10-CM

## 2023-11-15 PROCEDURE — 99214 OFFICE O/P EST MOD 30 MIN: CPT | Mod: GT | Performed by: FAMILY MEDICINE

## 2023-11-15 RX ORDER — CYCLOBENZAPRINE HCL 5 MG
5 TABLET ORAL 3 TIMES DAILY PRN
Qty: 90 TABLET | Refills: 1 | Status: SHIPPED | OUTPATIENT
Start: 2023-11-15 | End: 2024-02-11 | Stop reason: SDUPTHER

## 2023-11-15 NOTE — PROGRESS NOTES
Virtual Visit: Established Patient   This visit was conducted via Zoom using secure and encrypted videoconferencing technology.   The patient was in their home in the [facility].        Subjective:   CC:   Chief Complaint   Patient presents with    Medication Refill     Courtney Gupta is a 58 y.o. female presenting for evaluation and management of:    Wishes to discuss chronic diffuse pain.  She endorses pain that is diffuse throughout the body below the neck.  She has been taking Flexeril which she endorses good results with.  She has trialed duloxetine in the past as well as amitriptyline.  She has had negative side effects with both medications.  She would like to trial Flexeril again in the future.  She denies any drowsiness or decrease in function.  She reports that she has good effects with the Flexeril and she feels like she is able to function well.  She denies any danger when she takes it.  She denies any heavy lifting or risks that could pose from taking the Flexeril.  She reports that when she takes the medication she feels more capable.  She denies any drowsy like effects.  She does have a history of elevated rheumatoid factor.  She is open to seeing rheumatology for definitive diagnosis of rheumatoid arthritis or not.    She has seen pain medicine in the past, pain medicine does endorse the use of Flexeril.    She also has hypertension.  She is taking her losartan as prescribed.  Blood pressure reading last at home was 120s over 80s.  Denies any chest pain currently.    ROS   See HPI for relevant review of systems.    Current medicines (including changes today)  Current Outpatient Medications   Medication Sig Dispense Refill    cyclobenzaprine (FLEXERIL) 5 mg tablet Take 1 Tablet by mouth 3 times a day as needed for Muscle Spasms or Moderate Pain. 90 Tablet 1    DULoxetine (CYMBALTA) 30 MG Cap DR Particles Take 1 capsule by mouth daily for 7 days then take 2 capsules daily. 50 Capsule 2     GAVILYTE-G 236 g Recon Soln FOLLOW INSTRUCTIONS FROM [employer]. CALL [phone] WITH QUESTIONS.      meloxicam (MOBIC) 15 MG tablet Take 1 Tablet by mouth every day. Take 1 pill once daily for 14 days when having flares. 14 Tablet 0    estradiol (ESTRACE VAGINAL) 0.1 MG/GM vaginal cream Apply 1g cream inside vagina twice per week 1 Each 3    losartan (COZAAR) 50 MG Tab Take 1 Tablet by mouth every day. 90 Tablet 3     No current facility-administered medications for this visit.       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    Physical Exam:  Constitutional: Alert, no distress, well-groomed.  Skin: No rashes in visible areas.  Eye: Round. Conjunctiva clear, lids normal. No icterus.   ENMT: Lips pink without lesions, good dentition, moist mucous membranes. Phonation normal.  Neck: No masses, no thyromegaly. Moves freely without pain.  Respiratory: Unlabored respiratory effort, no cough or audible wheeze  Psych: Alert and oriented x3, normal affect and mood.     Assessment and Plan:   The following treatment plan was discussed:     1. Arthralgia, unspecified joint  - cyclobenzaprine (FLEXERIL) 5 mg tablet; Take 1 Tablet by mouth 3 times a day as needed for Muscle Spasms or Moderate Pain.  Dispense: 90 Tablet; Refill: 1  - Referral to Rheumatology    2. Hypertension, unspecified type    1. Arthralgia, unspecified joint  Patient's signs and symptoms are most consistent with fibromyalgia.  However, given elevated rheumatoid factor due to his difficult to determine whether or not she  does have a history of rheumatoid arthritis.  We will refer to rheumatology for definitive diagnosis.  We will also refill Flexeril.  This is a good medication given that she is nondrowsy and is able to function at her normal level when taking it.  Discussed patient's history of reflux and advised her to stop taking meloxicam.  Discussed Voltaren as an alternative.  She verbalized understanding and agreement to tart using Voltaren and stop using meloxicam.  ER precautions provided regarding any type of vomiting with blood in it or blood in the stool.  Counseled to continue taking PPI for reflux especially during nonsteroidal anti-inflammatory use.  - cyclobenzaprine (FLEXERIL) 5 mg tablet; Take 1 Tablet by mouth 3 times a day as needed for Muscle Spasms or Moderate Pain.  Dispense: 90 Tablet; Refill: 1  - Referral to Rheumatology    2. Hypertension, unspecified type  Patient's hypertension appears to be well controlled at present.  Continue current dose of losartan.    Follow-up: Return in about 3 months (around 2/15/2024), or if symptoms worsen or fail to improve.       Patient verbalizes understanding and agreement with assessment and plan.    Of note, audio on the Zoom call did not function appropriately.  Video conference remained in effect.  Additional phone was used to help with speaking to each other.  Phone number dialed was [phone].    Alex Ospina M.D.

## 2023-11-22 DIAGNOSIS — M25.50 ARTHRALGIA, UNSPECIFIED JOINT: ICD-10-CM

## 2023-11-27 RX ORDER — MELOXICAM 15 MG/1
TABLET ORAL
Qty: 14 TABLET | Refills: 0 | Status: SHIPPED | OUTPATIENT
Start: 2023-11-27 | End: 2023-12-14

## 2023-11-29 ENCOUNTER — APPOINTMENT (OUTPATIENT)
Dept: MEDICAL GROUP | Facility: CLINIC | Age: 58
End: 2023-11-29
Payer: COMMERCIAL

## 2023-12-01 ENCOUNTER — APPOINTMENT (OUTPATIENT)
Dept: ADMISSIONS | Facility: MEDICAL CENTER | Age: 58
End: 2023-12-01
Attending: STUDENT IN AN ORGANIZED HEALTH CARE EDUCATION/TRAINING PROGRAM
Payer: COMMERCIAL

## 2023-12-04 NOTE — PROGRESS NOTES
"Urogynecology and Pelvic Reconstructive Surgery Follow Up    Courtney Gupta MRN:[id number] :1965    Referred by: Gale Davis MD    Reason for Visit:   Chief Complaint   Patient presents with    Pre-op Exam         Subjective     History of Presenting Illness:    Courtney Gupta is a 58 y.o. year old P3 who presents for follow up    She has had a chance to review all preoperative materials and brings questions      Initial HPI: She was referred by Dr. Davis for the evaluation and management of prolapse.      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  She has a splint on her perineum and noted to have bowel movements frequently    She also gets kidney stones and was referred to urology at her recent PCP visit (9/15)    She has suspected Crohn's, not confirmed. Also has UC, just takes omeprazole, no immusuppression.       Prior Pelvic surgery:   2005 abdominal hysterectomy, no mesh  Multiple other laparotomies for fibroids/cysts, unclear  Cholecystectomy     Prior treatment:   Kegels and online pelvic PT       Pelvic floor symptom review:     Bladder:   Voids per day: 2-3 Voids per night: 1      Urinary incontinence episodes per day: non e   Bladder emptying: Complete   Voiding symptoms: None   UTI in last 12 months: None   Other urologic history: Stones      Prolapse:     Prolapse symptoms: Bulge, Exteriorized Tissue, and Pelvic Pressure   Degree of prolapse: Beyond Introitus   Duration of prolapse symptoms: 8 months      Bowel:    Constipation: Yes - not too bothersome   Bowel movements per day: 1-2    Straining to empty bowels: Yes   Splinting to evacuate: Yes   Painful evacuation: Yes   Difficulty emptying rectum: No    Incontinence to stool: No   Blood in stool: No    Hemorrhoids: No    Bowel conditions: Ulcerative Colitis   Most recent colonoscopy: scheduled 10/2023 "       Sexual function:    Sexually active: No  - just due relations   Gender of partners: Male   Pain with intercourse: No            Past medical and surgical history    Past obstetric history   Number of vaginal deliveries: 3   Number of  deliveries: 0   History of vacuum/forceps: No    History of obstetric anal sphincter injury: Yes    Past gynecological history:    Last menstrual period: No LMP recorded. Patient has had a hysterectomy.       Past medical history:  Past Medical History:   Diagnosis Date    Hypertension      Past surgical history:  Past Surgical History:   Procedure Laterality Date    CHOLECYSTECTOMY      LUMPECTOMY      VAGINAL HYSTERECTOMY TOTAL      Hysterectomy,Total Vaginal     Medications:has a current medication list which includes the following prescription(s): meloxicam, cyclobenzaprine, estradiol, losartan, duloxetine, and gavilyte-g.  Allergies:Other drug  Family history:  Family History   Problem Relation Age of Onset    No Known Problems Mother     Heart Disease Father     Diabetes Father      Social history: reports that she has never smoked. She has never used smokeless tobacco. She reports that she does not currently use alcohol. She reports current drug use. Drug: Marijuana.    Review of systems: A full review of systems was performed, and negative with the exception of want is noted above in the HPI.        Objective        BP (!) 141/88 (BP Location: Left arm, Patient Position: Sitting, BP Cuff Size: Adult)   Wt 140 lb   BMI 23.66 kg/m²     Physical Exam  Vitals reviewed. Exam conducted with a chaperone present (MA - see notes.).   Constitutional:       Appearance: Normal appearance.   HENT:      Head: Normocephalic.      Mouth/Throat:      Mouth: Mucous membranes are moist.   Cardiovascular:      Rate and Rhythm: Normal rate.   Pulmonary:      Effort: Pulmonary effort is normal.   Abdominal:      Palpations: Abdomen is soft. There is no mass.      Tenderness: There is  no abdominal tenderness.   Skin:     General: Skin is warm and dry.   Neurological:      Mental Status: She is alert.   Psychiatric:         Mood and Affect: Mood normal.         Genitourinary:    External female genitalia: WNL   Vulva: WNL   Bulbocavernosus reflex: Intact   Anal wink reflex: Intact   Perineal sensation: WNL   Urethra: Atrophic   Vagina: Atrophic   Atrophy: Moderate   Cough stress test: Negative    Pelvic floor: (Prior)   POP-Q: Aa -1.5 / Ba -1.5 / TVL 8 / C -4 / D X / Ap 0 / Bp 0 / GH 4.5 / PB 2   Rectovaginal examination with a palpable distal rectocele and perineocele which is exacerbated with Valsalva.  Attenuated perineal body.   Prolapse stage: 2  Urethral tenderness: No    Bladder/ suprapubic tenderness: No    Levator tenderness: None   Levator muscle tone: WNL   Pelvic floor contraction strength (modified Oxford scale): 2=Weak   Pelvic floor contraction duration: Brief    Bimanual exam: no masses   Anal resting tone: WNL   Anal squeeze tone: Decreased   Palpable anal sphincter defect: No       Procedure Performed: No    Diagnostic test and records review:     Post-void residual:(prior)  6 mL, performed by Bladder Scanner    Labs: n/a    Radiology: n/a    Documentation reviewed: Prior EMR Records           Assessment & Plan     Courtney Gupta is a 58 y.o. year old P3 with prolapse. We discussed my recommendations for further diagnosis and treatment at length today.     1. Rectocele  2. Perineocele  3. Vaginal vault prolapse  4. Outlet dysfunction constipation    At her prior visit as well as today we reviewed all treatment options for prolapse including conservative/observation, pessary, and both vaginal and laparoscopic approaches as well as native tissue and mesh augmented approaches. After detailed counseling about all prolapse treatment options and shared decision-making, she opts for a native tissue vaginal reconstructive approach to prolapse repair via Pelvic exam under anesthesia,  posterior repair, perineorrhaphy, possible vault suspension, and all indicated procedures.    She has reviewed all preoperative written materials and expressed understanding about the procedure, risks, benefits, and recovery    Benefits of surgery were reviewed, including functional outcomes (bladder/bowel/sexual). Risks of surgery were  also discussed including anesthesia, bleeding, infection, damage to surrounding organs (bladder, ureter, urethra, bowel, blood vessel, nerves), possible blood transfusion, recurrent prolapse, transient voiding dysfunction requiring catheterization, new/worsening urinary incontinence, pain with sex.     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 She will likely go home on the same day as the surgery. She was counseled on what to expect in the recovery room including voiding trial and possible vaginal packing removal, as well as what to expect if voiding trial is unsuccessful, which would be transient catheterization.   Discussed trajectory of recovery, including maximizing NSAIDs and Tylenol, and that narcotics are not routinely given.  Discussed restrictions including heavy lifting that requires straining, driving while on narcotics, nothing in the vagina and no bathing/swimming for at least 6 weeks until evaluated in the office.  Return to work discussed.  *Please see the corresponding after visit summary counseling packet for detailed counseling provided for the patient.     Pre-op meds: acetaminophen 1000mg PO, phenazopyridine 200mg PO, Cefazolin 2gm IV   Post-op medication plan: ibuprofen, tylenol, miralax   Home medication review: She should additionally hold all NSAIDs/meloxicam and supplements for 1 week prior to surgery.             5. Atrophic vaginitis  She is prescribed vaginal estrogen at last visit but was unable to fill due to extensive cost of the  prescription.  Will work on a prior authorization but if this is not successful I recommend sending her Estrace prescription to [facility].             Santosh Maher MD, FACOG  Urogynecology and Pelvic Reconstructive Surgery  Department of Obstetrics and Gynecology  [facility]        This medical record contains text that has been entered with the assistance of computer voice recognition and dictation software.  Therefore, it may contain unintended errors in text, spelling, punctuation, or grammar

## 2023-12-05 ENCOUNTER — GYNECOLOGY VISIT (OUTPATIENT)
Dept: GYNECOLOGY | Facility: CLINIC | Age: 58
End: 2023-12-05
Payer: COMMERCIAL

## 2023-12-05 VITALS — DIASTOLIC BLOOD PRESSURE: 88 MMHG | WEIGHT: 140 LBS | BODY MASS INDEX: 23.66 KG/M2 | SYSTOLIC BLOOD PRESSURE: 141 MMHG

## 2023-12-05 DIAGNOSIS — N81.6 RECTOCELE: ICD-10-CM

## 2023-12-05 DIAGNOSIS — N81.9 VAGINAL VAULT PROLAPSE: ICD-10-CM

## 2023-12-05 DIAGNOSIS — K59.02 OUTLET DYSFUNCTION CONSTIPATION: ICD-10-CM

## 2023-12-05 DIAGNOSIS — N81.81 PERINEOCELE: ICD-10-CM

## 2023-12-05 DIAGNOSIS — N95.2 ATROPHIC VAGINITIS: ICD-10-CM

## 2023-12-05 PROCEDURE — 3079F DIAST BP 80-89 MM HG: CPT | Performed by: STUDENT IN AN ORGANIZED HEALTH CARE EDUCATION/TRAINING PROGRAM

## 2023-12-05 PROCEDURE — 99214 OFFICE O/P EST MOD 30 MIN: CPT | Performed by: STUDENT IN AN ORGANIZED HEALTH CARE EDUCATION/TRAINING PROGRAM

## 2023-12-05 PROCEDURE — 3077F SYST BP >= 140 MM HG: CPT | Performed by: STUDENT IN AN ORGANIZED HEALTH CARE EDUCATION/TRAINING PROGRAM

## 2023-12-05 ASSESSMENT — FIBROSIS 4 INDEX: FIB4 SCORE: 0.53

## 2023-12-05 NOTE — PROGRESS NOTES
PT here for Pre Op  Surgery with  on 1/5/24  PT aware to arrive 2 hours prior  [facility] # [phone]  Pharmacy Verified

## 2023-12-14 ENCOUNTER — PRE-ADMISSION TESTING (OUTPATIENT)
Dept: ADMISSIONS | Facility: MEDICAL CENTER | Age: 58
End: 2023-12-14
Attending: STUDENT IN AN ORGANIZED HEALTH CARE EDUCATION/TRAINING PROGRAM
Payer: COMMERCIAL

## 2023-12-14 RX ORDER — OMEPRAZOLE 10 MG/1
10 CAPSULE, DELAYED RELEASE ORAL DAILY
COMMUNITY

## 2023-12-19 ENCOUNTER — PRE-ADMISSION TESTING (OUTPATIENT)
Dept: ADMISSIONS | Facility: MEDICAL CENTER | Age: 58
End: 2023-12-19
Attending: STUDENT IN AN ORGANIZED HEALTH CARE EDUCATION/TRAINING PROGRAM
Payer: COMMERCIAL

## 2023-12-19 DIAGNOSIS — Z01.812 PRE-OPERATIVE LABORATORY EXAMINATION: ICD-10-CM

## 2023-12-19 LAB
ANION GAP SERPL CALC-SCNC: 9 MMOL/L (ref 7–16)
BUN SERPL-MCNC: 14 MG/DL (ref 8–22)
CALCIUM SERPL-MCNC: 9.8 MG/DL (ref 8.5–10.5)
CHLORIDE SERPL-SCNC: 105 MMOL/L (ref 96–112)
CO2 SERPL-SCNC: 28 MMOL/L (ref 20–33)
CREAT SERPL-MCNC: 0.42 MG/DL (ref 0.5–1.4)
GFR SERPLBLD CREATININE-BSD FMLA CKD-EPI: 113 ML/MIN/1.73 M 2
GLUCOSE SERPL-MCNC: 83 MG/DL (ref 65–99)
POTASSIUM SERPL-SCNC: 3.7 MMOL/L (ref 3.6–5.5)
SODIUM SERPL-SCNC: 142 MMOL/L (ref 135–145)

## 2023-12-19 PROCEDURE — 36415 COLL VENOUS BLD VENIPUNCTURE: CPT

## 2023-12-19 PROCEDURE — 80048 BASIC METABOLIC PNL TOTAL CA: CPT

## 2023-12-20 ENCOUNTER — TELEPHONE (OUTPATIENT)
Dept: OBGYN | Facility: CLINIC | Age: 58
End: 2023-12-20
Payer: COMMERCIAL

## 2023-12-20 NOTE — TELEPHONE ENCOUNTER
Patient called in stating she was returning Santiago's call. It seems as though he was calling to get her new insurance for her surgery with Dr. Maher next month. PT states she does not have new insurance right now but will call back Friday or Monday when she does. Santiago informed.

## 2023-12-29 ENCOUNTER — TELEPHONE (OUTPATIENT)
Dept: OBGYN | Facility: CLINIC | Age: 58
End: 2023-12-29
Payer: COMMERCIAL

## 2023-12-29 NOTE — TELEPHONE ENCOUNTER
[facility] to let pt know of 's recommendation.  ----- Message from Santosh Maher M.D. sent at 12/29/2023 11:33 AM PST -----  Regarding: RE: Sick before Surgery  Thanks! Have her contact us with COVID result, and then let us know on Tuesday (1/2) if she is feeling better or still sick so we can make a final decision.   ----- Message -----  From: Thalia Haider, Med Ass't  Sent: 12/29/2023   9:38 AM PST  To: Santosh Maher M.D.  Subject: Sick before Surgery                              Good morning ,       This pt called in today stating that she has surgery with you on 01/05/2024. She is stating that she is currently very sick. Symptoms currently are diarrhea, body aches, cough, trouble breathing and lungs are sore. Pt states that she is going to get a COVID test done today, and will let us know the result of that. She just wants to know what to expect about her surgery now that she is sick. She knows that it might need to be rescheduled. She just doesn't know if she should just reschedule that now, or if she should wait until closer to the surgery date and see how she is feeling then. Please advise! Thank you!

## 2023-12-29 NOTE — TELEPHONE ENCOUNTER
Pt called in today stating that she has a scheduled surgery with  on 01/05/2024, but she is currently sick. She states that she has diarrhea, body aches, cough, sore throat, trouble breathing and soreness in her lungs. Informed the pt that I'm not to sure what  will want to do in regards to scheduled surgery. Informed pt that we can send a message to the provider and ask him if pt needs to be rescheduled or if she should wait. Message sent to , and pt will wait for response and has no further questions.

## 2024-01-03 DIAGNOSIS — M25.50 ARTHRALGIA, UNSPECIFIED JOINT: ICD-10-CM

## 2024-01-03 RX ORDER — CYCLOBENZAPRINE HCL 5 MG
5 TABLET ORAL 3 TIMES DAILY PRN
Qty: 90 TABLET | Refills: 1 | OUTPATIENT
Start: 2024-01-03

## 2024-01-05 ENCOUNTER — ANESTHESIA EVENT (OUTPATIENT)
Dept: SURGERY | Facility: MEDICAL CENTER | Age: 59
End: 2024-01-05
Payer: COMMERCIAL

## 2024-01-05 ENCOUNTER — HOSPITAL ENCOUNTER (OUTPATIENT)
Facility: MEDICAL CENTER | Age: 59
End: 2024-01-05
Attending: STUDENT IN AN ORGANIZED HEALTH CARE EDUCATION/TRAINING PROGRAM | Admitting: STUDENT IN AN ORGANIZED HEALTH CARE EDUCATION/TRAINING PROGRAM
Payer: COMMERCIAL

## 2024-01-05 ENCOUNTER — ANESTHESIA (OUTPATIENT)
Dept: SURGERY | Facility: MEDICAL CENTER | Age: 59
End: 2024-01-05
Payer: COMMERCIAL

## 2024-01-05 VITALS
DIASTOLIC BLOOD PRESSURE: 71 MMHG | BODY MASS INDEX: 23.3 KG/M2 | WEIGHT: 136.47 LBS | HEIGHT: 64 IN | RESPIRATION RATE: 18 BRPM | TEMPERATURE: 98.4 F | HEART RATE: 97 BPM | OXYGEN SATURATION: 94 % | SYSTOLIC BLOOD PRESSURE: 124 MMHG

## 2024-01-05 PROCEDURE — 160039 HCHG SURGERY MINUTES - EA ADDL 1 MIN LEVEL 3: Performed by: STUDENT IN AN ORGANIZED HEALTH CARE EDUCATION/TRAINING PROGRAM

## 2024-01-05 PROCEDURE — 700111 HCHG RX REV CODE 636 W/ 250 OVERRIDE (IP): Performed by: ANESTHESIOLOGY

## 2024-01-05 PROCEDURE — 700101 HCHG RX REV CODE 250: Performed by: STUDENT IN AN ORGANIZED HEALTH CARE EDUCATION/TRAINING PROGRAM

## 2024-01-05 PROCEDURE — 160035 HCHG PACU - 1ST 60 MINS PHASE I: Performed by: STUDENT IN AN ORGANIZED HEALTH CARE EDUCATION/TRAINING PROGRAM

## 2024-01-05 PROCEDURE — 160048 HCHG OR STATISTICAL LEVEL 1-5: Performed by: STUDENT IN AN ORGANIZED HEALTH CARE EDUCATION/TRAINING PROGRAM

## 2024-01-05 PROCEDURE — 160009 HCHG ANES TIME/MIN: Performed by: STUDENT IN AN ORGANIZED HEALTH CARE EDUCATION/TRAINING PROGRAM

## 2024-01-05 PROCEDURE — A9270 NON-COVERED ITEM OR SERVICE: HCPCS | Performed by: STUDENT IN AN ORGANIZED HEALTH CARE EDUCATION/TRAINING PROGRAM

## 2024-01-05 PROCEDURE — 160046 HCHG PACU - 1ST 60 MINS PHASE II: Performed by: STUDENT IN AN ORGANIZED HEALTH CARE EDUCATION/TRAINING PROGRAM

## 2024-01-05 PROCEDURE — 700105 HCHG RX REV CODE 258: Performed by: STUDENT IN AN ORGANIZED HEALTH CARE EDUCATION/TRAINING PROGRAM

## 2024-01-05 PROCEDURE — 110454 HCHG SHELL REV 250: Performed by: STUDENT IN AN ORGANIZED HEALTH CARE EDUCATION/TRAINING PROGRAM

## 2024-01-05 PROCEDURE — 700111 HCHG RX REV CODE 636 W/ 250 OVERRIDE (IP): Performed by: STUDENT IN AN ORGANIZED HEALTH CARE EDUCATION/TRAINING PROGRAM

## 2024-01-05 PROCEDURE — 160025 RECOVERY II MINUTES (STATS): Performed by: STUDENT IN AN ORGANIZED HEALTH CARE EDUCATION/TRAINING PROGRAM

## 2024-01-05 PROCEDURE — 700102 HCHG RX REV CODE 250 W/ 637 OVERRIDE(OP): Performed by: STUDENT IN AN ORGANIZED HEALTH CARE EDUCATION/TRAINING PROGRAM

## 2024-01-05 PROCEDURE — 700101 HCHG RX REV CODE 250: Performed by: ANESTHESIOLOGY

## 2024-01-05 PROCEDURE — 160002 HCHG RECOVERY MINUTES (STAT): Performed by: STUDENT IN AN ORGANIZED HEALTH CARE EDUCATION/TRAINING PROGRAM

## 2024-01-05 PROCEDURE — 57250 REPAIR RECTUM & VAGINA: CPT | Performed by: STUDENT IN AN ORGANIZED HEALTH CARE EDUCATION/TRAINING PROGRAM

## 2024-01-05 PROCEDURE — 57282 COLPOPEXY EXTRAPERITONEAL: CPT | Performed by: STUDENT IN AN ORGANIZED HEALTH CARE EDUCATION/TRAINING PROGRAM

## 2024-01-05 PROCEDURE — 160028 HCHG SURGERY MINUTES - 1ST 30 MINS LEVEL 3: Performed by: STUDENT IN AN ORGANIZED HEALTH CARE EDUCATION/TRAINING PROGRAM

## 2024-01-05 RX ORDER — LIDOCAINE HYDROCHLORIDE 10 MG/ML
INJECTION, SOLUTION EPIDURAL; INFILTRATION; INTRACAUDAL; PERINEURAL
Status: DISCONTINUED
Start: 2024-01-05 | End: 2024-01-05 | Stop reason: HOSPADM

## 2024-01-05 RX ORDER — ONDANSETRON 2 MG/ML
INJECTION INTRAMUSCULAR; INTRAVENOUS PRN
Status: DISCONTINUED | OUTPATIENT
Start: 2024-01-05 | End: 2024-01-05 | Stop reason: SURG

## 2024-01-05 RX ORDER — ACETAMINOPHEN 500 MG
1000 TABLET ORAL
Status: COMPLETED | OUTPATIENT
Start: 2024-01-05 | End: 2024-01-05

## 2024-01-05 RX ORDER — SODIUM CHLORIDE, SODIUM LACTATE, POTASSIUM CHLORIDE, CALCIUM CHLORIDE 600; 310; 30; 20 MG/100ML; MG/100ML; MG/100ML; MG/100ML
INJECTION, SOLUTION INTRAVENOUS CONTINUOUS
Status: ACTIVE | OUTPATIENT
Start: 2024-01-05 | End: 2024-01-05

## 2024-01-05 RX ORDER — OXYCODONE HCL 5 MG/5 ML
5 SOLUTION, ORAL ORAL
Status: DISCONTINUED | OUTPATIENT
Start: 2024-01-05 | End: 2024-01-05 | Stop reason: HOSPADM

## 2024-01-05 RX ORDER — ROCURONIUM BROMIDE 10 MG/ML
INJECTION, SOLUTION INTRAVENOUS PRN
Status: DISCONTINUED | OUTPATIENT
Start: 2024-01-05 | End: 2024-01-05 | Stop reason: SURG

## 2024-01-05 RX ORDER — MIDAZOLAM HYDROCHLORIDE 1 MG/ML
INJECTION INTRAMUSCULAR; INTRAVENOUS PRN
Status: DISCONTINUED | OUTPATIENT
Start: 2024-01-05 | End: 2024-01-05 | Stop reason: SURG

## 2024-01-05 RX ORDER — DEXMEDETOMIDINE HYDROCHLORIDE 100 UG/ML
INJECTION, SOLUTION INTRAVENOUS PRN
Status: DISCONTINUED | OUTPATIENT
Start: 2024-01-05 | End: 2024-01-05 | Stop reason: SURG

## 2024-01-05 RX ORDER — EPINEPHRINE 1 MG/ML(1)
AMPUL (ML) INJECTION
Status: DISCONTINUED
Start: 2024-01-05 | End: 2024-01-05 | Stop reason: HOSPADM

## 2024-01-05 RX ORDER — OXYCODONE HCL 5 MG/5 ML
10 SOLUTION, ORAL ORAL
Status: DISCONTINUED | OUTPATIENT
Start: 2024-01-05 | End: 2024-01-05 | Stop reason: HOSPADM

## 2024-01-05 RX ORDER — GENTAMICIN SULFATE 40 MG/ML
INJECTION, SOLUTION INTRAMUSCULAR; INTRAVENOUS
Status: DISCONTINUED | OUTPATIENT
Start: 2024-01-05 | End: 2024-01-05 | Stop reason: HOSPADM

## 2024-01-05 RX ORDER — EPHEDRINE SULFATE 50 MG/ML
INJECTION, SOLUTION INTRAVENOUS PRN
Status: DISCONTINUED | OUTPATIENT
Start: 2024-01-05 | End: 2024-01-05 | Stop reason: SURG

## 2024-01-05 RX ORDER — DEXAMETHASONE SODIUM PHOSPHATE 4 MG/ML
INJECTION, SOLUTION INTRA-ARTICULAR; INTRALESIONAL; INTRAMUSCULAR; INTRAVENOUS; SOFT TISSUE PRN
Status: DISCONTINUED | OUTPATIENT
Start: 2024-01-05 | End: 2024-01-05 | Stop reason: SURG

## 2024-01-05 RX ORDER — HALOPERIDOL 5 MG/ML
1 INJECTION INTRAMUSCULAR
Status: DISCONTINUED | OUTPATIENT
Start: 2024-01-05 | End: 2024-01-05 | Stop reason: HOSPADM

## 2024-01-05 RX ORDER — HYDROMORPHONE HYDROCHLORIDE 1 MG/ML
0.1 INJECTION, SOLUTION INTRAMUSCULAR; INTRAVENOUS; SUBCUTANEOUS
Status: DISCONTINUED | OUTPATIENT
Start: 2024-01-05 | End: 2024-01-05 | Stop reason: HOSPADM

## 2024-01-05 RX ORDER — KETOROLAC TROMETHAMINE 30 MG/ML
INJECTION, SOLUTION INTRAMUSCULAR; INTRAVENOUS PRN
Status: DISCONTINUED | OUTPATIENT
Start: 2024-01-05 | End: 2024-01-05 | Stop reason: SURG

## 2024-01-05 RX ORDER — DIPHENHYDRAMINE HYDROCHLORIDE 50 MG/ML
12.5 INJECTION INTRAMUSCULAR; INTRAVENOUS
Status: DISCONTINUED | OUTPATIENT
Start: 2024-01-05 | End: 2024-01-05 | Stop reason: HOSPADM

## 2024-01-05 RX ORDER — HYDROMORPHONE HYDROCHLORIDE 1 MG/ML
0.4 INJECTION, SOLUTION INTRAMUSCULAR; INTRAVENOUS; SUBCUTANEOUS
Status: DISCONTINUED | OUTPATIENT
Start: 2024-01-05 | End: 2024-01-05 | Stop reason: HOSPADM

## 2024-01-05 RX ORDER — CEFAZOLIN SODIUM 1 G/3ML
INJECTION, POWDER, FOR SOLUTION INTRAMUSCULAR; INTRAVENOUS PRN
Status: DISCONTINUED | OUTPATIENT
Start: 2024-01-05 | End: 2024-01-05 | Stop reason: SURG

## 2024-01-05 RX ORDER — ONDANSETRON 2 MG/ML
4 INJECTION INTRAMUSCULAR; INTRAVENOUS
Status: DISCONTINUED | OUTPATIENT
Start: 2024-01-05 | End: 2024-01-05 | Stop reason: HOSPADM

## 2024-01-05 RX ORDER — HYDROMORPHONE HYDROCHLORIDE 1 MG/ML
0.2 INJECTION, SOLUTION INTRAMUSCULAR; INTRAVENOUS; SUBCUTANEOUS
Status: DISCONTINUED | OUTPATIENT
Start: 2024-01-05 | End: 2024-01-05 | Stop reason: HOSPADM

## 2024-01-05 RX ORDER — SCOLOPAMINE TRANSDERMAL SYSTEM 1 MG/1
1 PATCH, EXTENDED RELEASE TRANSDERMAL
Status: DISCONTINUED | OUTPATIENT
Start: 2024-01-05 | End: 2024-01-05 | Stop reason: HOSPADM

## 2024-01-05 RX ORDER — SODIUM CHLORIDE, SODIUM LACTATE, POTASSIUM CHLORIDE, CALCIUM CHLORIDE 600; 310; 30; 20 MG/100ML; MG/100ML; MG/100ML; MG/100ML
INJECTION, SOLUTION INTRAVENOUS CONTINUOUS
Status: DISCONTINUED | OUTPATIENT
Start: 2024-01-05 | End: 2024-01-05 | Stop reason: HOSPADM

## 2024-01-05 RX ORDER — GENTAMICIN SULFATE 40 MG/ML
INJECTION, SOLUTION INTRAMUSCULAR; INTRAVENOUS
Status: DISCONTINUED
Start: 2024-01-05 | End: 2024-01-05 | Stop reason: HOSPADM

## 2024-01-05 RX ADMIN — PROPOFOL 150 MG: 10 INJECTION, EMULSION INTRAVENOUS at 07:30

## 2024-01-05 RX ADMIN — ACETAMINOPHEN 1000 MG: 500 TABLET ORAL at 06:13

## 2024-01-05 RX ADMIN — ROCURONIUM BROMIDE 30 MG: 50 INJECTION, SOLUTION INTRAVENOUS at 07:30

## 2024-01-05 RX ADMIN — SODIUM CHLORIDE, POTASSIUM CHLORIDE, SODIUM LACTATE AND CALCIUM CHLORIDE: 600; 310; 30; 20 INJECTION, SOLUTION INTRAVENOUS at 07:14

## 2024-01-05 RX ADMIN — DEXMEDETOMIDINE 20 MCG: 100 INJECTION, SOLUTION INTRAVENOUS at 07:35

## 2024-01-05 RX ADMIN — DEXMEDETOMIDINE 10 MCG: 100 INJECTION, SOLUTION INTRAVENOUS at 07:57

## 2024-01-05 RX ADMIN — EPHEDRINE SULFATE 10 MG: 50 INJECTION, SOLUTION INTRAVENOUS at 08:24

## 2024-01-05 RX ADMIN — SODIUM CHLORIDE, POTASSIUM CHLORIDE, SODIUM LACTATE AND CALCIUM CHLORIDE: 600; 310; 30; 20 INJECTION, SOLUTION INTRAVENOUS at 08:48

## 2024-01-05 RX ADMIN — EPHEDRINE SULFATE 10 MG: 50 INJECTION, SOLUTION INTRAVENOUS at 08:15

## 2024-01-05 RX ADMIN — KETOROLAC TROMETHAMINE 30 MG: 30 INJECTION, SOLUTION INTRAMUSCULAR; INTRAVENOUS at 07:35

## 2024-01-05 RX ADMIN — SUGAMMADEX 200 MG: 100 INJECTION, SOLUTION INTRAVENOUS at 08:37

## 2024-01-05 RX ADMIN — ONDANSETRON 4 MG: 2 INJECTION INTRAMUSCULAR; INTRAVENOUS at 08:37

## 2024-01-05 RX ADMIN — MIDAZOLAM HYDROCHLORIDE 2 MG: 1 INJECTION, SOLUTION INTRAMUSCULAR; INTRAVENOUS at 07:30

## 2024-01-05 RX ADMIN — CEFAZOLIN 1 G: 1 INJECTION, POWDER, FOR SOLUTION INTRAMUSCULAR; INTRAVENOUS at 07:30

## 2024-01-05 RX ADMIN — DEXAMETHASONE SODIUM PHOSPHATE 8 MG: 4 INJECTION INTRA-ARTICULAR; INTRALESIONAL; INTRAMUSCULAR; INTRAVENOUS; SOFT TISSUE at 07:35

## 2024-01-05 RX ADMIN — SCOPOLAMINE 1 PATCH: 1.5 PATCH, EXTENDED RELEASE TRANSDERMAL at 06:13

## 2024-01-05 ASSESSMENT — PAIN SCALES - GENERAL: PAIN_LEVEL: 3

## 2024-01-05 ASSESSMENT — FIBROSIS 4 INDEX: FIB4 SCORE: 0.53

## 2024-01-05 NOTE — OR NURSING
0852- pt arrives from OR to PACU 5, report received from RN and anesthesia. Pt place on monitor. VSS,  NAD noted. Oral airway in place and removed on arrival.   O2 4L via mask.    Good to gravity draining clear yellow urine, peripad in place- CDI    0908-pt wakes, tolerating sips of water, updated on POC, denies other needs at this time    0935-pt's mom called and updated on status/ POC    1000- Dr Maher at bedside.   Packing removed by MD    1015- back filled 225cc of sterile water, pt assisted to BR and pt able to void >300cc of urine.    Pt dresses self independently.   Discharge instructions to family- verbalize understanding.    1021-PIV d/c'd intact. Pt escorted out in w/c by CCT, all belongings accounted for.

## 2024-01-05 NOTE — ANESTHESIA TIME REPORT
Anesthesia Start and Stop Event Times       Date Time Event    1/5/2024 0714 Anesthesia Start     0728 Ready for Procedure     0855 Anesthesia Stop          Responsible Staff  01/05/24      Name Role Begin End    Migue Hope M.D. Anesth 0714 0855          Overtime Reason:  no overtime (within assigned shift)    Comments:

## 2024-01-05 NOTE — OP REPORT
OPERATIVE REPORT     Name: Courtney Gupta    : 1965    MRN: [id number]       PRE-OP DIAGNOSIS:   Rectocele  Perineocele  Vaginal vault prolapse            POST-OP DIAGNOSIS:   Rectocele  Perineocele  Vaginal vault prolapse  Enterocele              PROCEDURE:   Extraperitoneal vaginal colpopexy - sacrospinous ligament, right (43346)  Posterior repair, enterocele repair, perineorrhaphy (93254)            SURGEON:   Surgeon(s) and Role:     * Santosh Maher M.D. - Primary            ANESTHESIA: General            FINDINGS:      Exam under anesthesia: Stage  prolapse, large palpable rectocele with perineocele and widened genital hiatus with attenuated perineal body.  Vault descent to 2 cm proximal to the introitus on tension.  Bimanual exam with no palpable adnexal masses. At the completion of the procedure there was excellent tricompartmental support, no sutures were palpated rectally.         ESTIMATED BLOOD LOSS: 25 mL            DRAINS: Good                   SPECIMENS: None            IMPLANTS: None            COMPLICATIONS: None            DISPOSITION: Discharge            CONDITION: Stable            INDICATION FOR SURGERY:     Courtney Gupta is a 58 y.o. year old P3 with prolapse, posterior predominant with attenuated perineum/perineocele and an apical component.  At her prior visit as well as today we reviewed all treatment options for prolapse including conservative/observation, pessary, and both vaginal and laparoscopic approaches as well as native tissue and mesh augmented approaches. After detailed counseling about all prolapse treatment options and shared decision-making, she opts for a native tissue vaginal reconstructive approach to prolapse repair via Pelvic exam under anesthesia, posterior repair, perineorrhaphy, possible vault suspension, and all indicated procedures.   She has reviewed all preoperative written materials and expressed understanding about the procedure, risks, benefits, and  recovery. Benefits of surgery were reviewed, including functional outcomes (bladder/bowel/sexual). Risks of surgery were  also discussed including anesthesia, bleeding, infection, damage to surrounding organs (bladder, ureter, urethra, bowel, blood vessel, nerves), possible blood transfusion, recurrent prolapse, transient voiding dysfunction requiring catheterization, new/worsening urinary incontinence, pain with sex. All questions were answered and consent was signed and witnessed.        TECHNIQUE:    I spoke with the patient in the preoperative holding area, where again risks, benefits, indications, and alternatives were reviewed and informed consent was obtained.  She was then transferred to the operative suite, where she was identified, procedure was confirmed.  She was placed in dorsal supine position, given general endotracheal anesthesia without difficulty.  She was then placed in a high dorsal lithotomy position  in yellowfin stirrups with all pressure points padded.  She was prepared and draped in usual sterile fashion with vaginal chlorhexidine prep with all pressure points padded.  An appropriate time-out was performed, where patient was identified, procedure was confirmed, and the operative team was introduced.  It was also confirmed that patient did receive cefazolin 1 g IV for prophylaxis, and she also received DVT prophylaxis with sequential compression devices bilaterally throughout the case.  At this time, exam under anesthesia revealed findings noted above.   A 16-Sammarinese Good catheter was then placed in the patient's  bladder for drainage throughout the procedure, and a [place] retractor was placed for retraction.     Initial exam under anesthesia was performed.  There was a significant vault prolapse, and vaginal length just reach to the right sacrospinous ligament without undue tension, was unable to reach the left.  The best area for vault suspension was then marked with a 2-0 Vicryl suture  for use later in the case, which would elevate the apex without undue tension on the anterior posterior walls.    Attention was then turned posteriorly.  There was a large palpable rectocele with significant perineal attenuation.  The posterior repair, enterocele repair and perineorrhaphy then proceed in the following fashion: Allis clamps were placed on the posterior introitus in a triangle-shaped fashion on the posterior introitus with care not to over narrow the vaginal introitus.  The area was then infiltrated with 1 % lidocaine with dilute epinephrine.  A triangle-shaped incision was then made over the posterior vaginal wall and the perineal skin with a scalpel.  Skin was then sharply dissected from the underlying central tendon in the perineal body and the rectovaginal fascia out to the level of the levator fascia bilaterally, with dissection continuing proximally to the level of the apex.  A significant apical/posterior enterocele was then encountered, which was also dissected out in its entirety and reduced in an extraperitoneal fashion with a 2-0 Vicryl pursestring suture with care to reduce peritoneal contents out of the closure.The rectocele was then plicated in the midline with 2-0 PDS stratafix in 2 running layers with care to approximate the distal rectovaginal fascia to the central tendon of the perineal body.  A third and finally a fourth layer of interrupted 2-0 Vicryl imbricating sutures were performed for complete reduction of the rectocele.    The sacrospinous vault suspension then proceed in the following fashion: On the patient's right-hand side the dissection plane between the vaginal epithelium and the rectovaginal fibromuscular layer was continued in the paravaginal and eventually pararectal space down both bluntly and sharply down to the level of the right ischial spine.  Using careful blunt dissection with the surgeons right index finger the sacrospinous ligament was cleared of all  surrounding tissue.  A Capio suture passing device was then brought into the operative field and a 0 Monodek PDS suture was then passed through the sacrospinous ligament approximately 2 fingerbreadths medial to the ischial spine.  Second Monodek PDS suture was placed in a similar fashion just laterally to the previously placed suture.  It was confirmed by palpation that the sutures were far away from vital structures near the ischial spine.  Excellent hemostasis at this perirectal tunnel was noted.  Both ends of the medial sacrspinous suture and were then passed from the retroperitoneal space through the previously tagged vaginal apex and into the vaginal canal and held.  The more lateral sacrospinous suture was then placed from the retroperitoneal space to through and through the vaginal epithelium and held.  The dissection plane was then copiously irrigated with saline with antibiotic.  A small-moderate Surgiflo hemostatic agent was placed into the dissection tunnel to help with venous hemostasis.  The posterior vaginal epithelium closure was started with 2-0 Vicryl in a running locked fashion with care to include the underlying fibromuscular tissue and obliterate dead space. This was paused long term. The sacrospinous sutures were then tied down with care to avoid suture bridging, with excellent elevation of the vaginal apex to the sacrospinous ligament. These sutures were then cut.     The perineorrhaphy then continued: A series of 0 Vicryl sutures were placed starting at the proximal portion of the perineal body, continuing more distally creating a level shelf without undue narrowing.  Sutures were first placed to the left-hand side, reached across the posterior fibromuscular layer to the right-hand side and held.  The area was irrigated again.  The perineorrhaphy sutures were tied serially and the vaginal epithelial layer closure was completed.  There was good lengthening of the perineal body with an adequate  genital hiatus diameter.  Another rectal exam was then performed with no sutures palpated.  A right-sided predental nerve block was then performed by placing a 22-gauge spinal needle through the medial aspect of the ischial spine through the sacrospinous ligament and flooding with 8 cc of lidocaine with epinephrine.  Hemostasis was noted at the completion of the procedure.    A vaginal pack was placed for temporary tamponade in the recovery room to allow tissue reapproximation. All counts were correct.  The patient was then wakened from general anesthesia easily, and brought to the PACU in stable condition. Anticipate discharge home later today.         Santosh Maher MD, FACOG  Urogynecology and Reconstructive Pelvic Surgery  Department of Obstetrics and Gynecology  [facility]

## 2024-01-05 NOTE — ANESTHESIA PROCEDURE NOTES
Airway    Date/Time: 1/5/2024 7:32 AM    Performed by: Migue Hope M.D.  Authorized by: Migue Hope M.D.    Location:  OR  Urgency:  Elective  Indications for Airway Management:  Anesthesia      Spontaneous Ventilation: absent    Sedation Level:  Deep  Preoxygenated: Yes    Patient Position:  Sniffing  Final Airway Type:  Endotracheal airway  Final Endotracheal Airway:  ETT  Cuffed: Yes    Technique Used for Successful ETT Placement:  Direct laryngoscopy    Insertion Site:  Oral  Blade Type:  Lev  Laryngoscope Blade/Videolaryngoscope Blade Size:  3  ETT Size (mm):  6.5  Measured from:  Teeth  ETT to Teeth (cm):  22  Placement Verified by: auscultation and capnometry    Cormack-Lehane Classification:  Grade I - full view of glottis  Number of Attempts at Approach:  1

## 2024-01-05 NOTE — ANESTHESIA PREPROCEDURE EVALUATION
Case: [id number] Date/Time: 01/05/24 0715    Procedures:       PELVIC EXAM UNDER ANESTHESIA, POSTERIOR REPAIR, PERINEORRHAPHY, POSSIBLE VAULT SUSPENSION, AND ALL INDICATED PROCEDURES      EXAM UNDER ANESTHESIA    Pre-op diagnosis: VAGINAL PROLAPSE, RECTOCELE    Location: [facility] ROOM 25 / SURGERY SAME DAY [facility]    Surgeons: Santosh Maher M.D.            Relevant Problems   NEURO   (positive) Chronic nonintractable headache      CARDIAC   (positive) Hypertension       Physical Exam    Airway   Mallampati: II  TM distance: >3 FB  Neck ROM: full       Cardiovascular - normal exam  Rhythm: regular  Rate: normal  (-) murmur     Dental - normal exam           Pulmonary - normal exam  Breath sounds clear to auscultation     Abdominal    Neurological - normal exam                   Anesthesia Plan    ASA 1       Plan - general       Airway plan will be ETT          Induction: intravenous      Pertinent diagnostic labs and testing reviewed    Informed Consent:    Anesthetic plan and risks discussed with patient.    Use of blood products discussed with: patient whom consented to blood products.

## 2024-01-05 NOTE — DISCHARGE INSTRUCTIONS
What to Expect Post Anesthesia    Rest and take it easy for the first 24 hours.  A responsible adult is recommended to remain with you during that time.  It is normal to feel sleepy.  We encourage you to not do anything that requires balance, judgment or coordination.    FOR 24 HOURS DO NOT:  Drive, operate machinery or run household appliances.  Drink beer or alcoholic beverages.  Make important decisions or sign legal documents.    To avoid nausea, slowly advance diet as tolerated, avoiding spicy or greasy foods for the first day.  Add more substantial food to your diet according to your provider's instructions.  Babies can be fed formula or breast milk as soon as they are hungry.  INCREASE FLUIDS AND FIBER TO AVOID CONSTIPATION.    MILD FLU-LIKE SYMPTOMS ARE NORMAL.  YOU MAY EXPERIENCE GENERALIZED MUSCLE ACHES, THROAT IRRITATION, HEADACHE AND/OR SOME NAUSEA.    If any questions arise, call your provider.  If your provider is not available, please feel free to call the Surgical Center at [phone].    MEDICATIONS: Resume taking daily medication.  Take prescribed pain medication with food.  If no medication is prescribed, you may take non-aspirin pain medication if needed.  PAIN MEDICATION CAN BE VERY CONSTIPATING.  Take a stool softener or laxative such as senokot, pericolace, or milk of magnesia if needed.    Last pain medication given Tylenol 1000mg at 6am-may take next dose @ 2pm, Toradol (similar to ibuprofen) at 7:30am- may take next dose @ 3:30pm                  Please remove scopolamine patch from behind ear within 72 hours, or sooner.  Wash hands and behind ear after removal.    Post-op: What to expect after your surgery    For urgent post-operative questions or concerns, please call Dr. Maher's direct on-call cell line at [phone].     For less-urgent matters (Monday - Friday), you may send a message through [employer] or call the general Women's Health line at [phone].     Bladder  function  Try to empty your bladder (urinate) at regular intervals by sitting on the toilet and relaxing.  You may need to adjust your positioning (lean forward or back) to empty the bladder fully. It is important that you do not push or strain to empty your bladder.     Call the surgeon at the number above if you cannot urinate. Also, call for treatment if you have signs and symptoms of a urinary tract infection, including:   Burning with urination  Bladder pain  Worsening need to urinate right away  Urine with a bad smell    If you are sent home with a catheter:   Empty the catheter bag when it becomes full. The bag should be kept at a level below your hips to drain properly. When you are asleep, the bag should dangle off the bed. and should dangle off of the bed while you are asleep. You will be called the day after you go home to schedule an office visit to test your bladder and remove the catheter.     Vaginal care:   Do not go swimming, take sitting baths, or have sexual intercourse for 6 weeks after surgery. Do not place anything in the vagina except vaginal estrogen cream, if instructed to do so by your surgeon.     Vaginal discharge and bleeding/spotting is also normal through the entire 6-week recovery. Sometimes small sutures will fall out of the vagina as they dissolve. This is normal. Contact the office with any heavy bleeding soaking through pads, bad-smelling discharge, or worsening pain.     Pain management:  Surgical pain is controlled in most patients with only non-steroidal anti-inflammatory drugs (NSAIDs, such as ibuprofen, “Advil”), and acetaminophen (Tylenol). These drugs can be taken together without interaction.     In the hospital, your nurse will give you these medications at regular intervals to both treat and to prevent pain. If these are not controlling your pain, you may ask the nurse for additional medication.     When you go home, you will also take NSAIDs and acetaminophen for pain  management. I recommend the following at-home pain regimen:    Take ibuprofen 800mg three times per day, along with tylenol 1000mg three times per day, for the first 5-7 days after surgery to prevent and treat pain and inflammation.   After 5-7 days, you may take 400mg ibuprofen and 500mg tylenol as-needed     Sometimes, a short course of narcotics such as oxycodone and hydromorphone is  required, but this is not routine. We do not recommend using narcotics regularly as it can lead to constipation or dizziness, and falls.     Do not drive or operate heavy machinery while using narcotics. You are unlikely to become addicted if you need to take a narcotic medication a few times within the first week of your surgery.  After the first few days to one week, your pain should decrease and you should not have pain severe enough to need narcotics. If you continue having severe pain, contact your surgeon for re-evaluation.     Bowel function  Constipation is common after surgery. This means it may take several days before having a normal bowel movement. It is important to take extra steps to keep your stools soft to avoid straining with bowel movements. Straining may damage the prolapse repair before it has healed. Most patients will be given a prescription for a stool softener (docusate) as well as a gentle laxative (Miralax or lactulose). These mediations adds water to the stool to make it easier to pass. Take them daily throughout your recovery. Hold for a day if you develop diarrhea.     Call us if you experience any repeated episodes of vomiting, worsening abdominal pain/bloating, or are unable to have a bowel movement for more than 3 days.     Activity restrictions  During the first 6 weeks avoid any type of heavy lifting that requires you to strain.  Gentle walking is good exercise. Start with about 10 minutes a day when you feel ready and build up gradually. Avoid repetitive squatting or bending at the waist. Avoid  any fitness-type training, aerobics, etc. for at least 6 weeks after surgery. Generally, you will need 4-6 weeks off work. This period may be longer if you have a very physical job.    Return to sex  When your surgeon clears you to resume sex (if desired), begin slowly and to use enough lubrication to help ease the discomfort. Because your vagina and pelvis have been re-structured, and it can take time to get used to sex after surgery. As scar tissue softens over time you will feel less discomfort. If discomfort does not go away, contact the office to schedule an exam and to discuss other options. In some cases, your surgeon may prescribe a low dose of vaginal estrogen to help with vaginal dryness and pain that may happen with sex.

## 2024-01-05 NOTE — ANESTHESIA POSTPROCEDURE EVALUATION
Patient: Courtney Gupta    Procedure Summary       Date: 01/05/24 Room / Location: [facility] ROOM 25 / SURGERY SAME DAY [facility]    Anesthesia Start: 0714 Anesthesia Stop: 0855    Procedures:       PELVIC EXAM UNDER ANESTHESIA, POSTERIOR REPAIR, PERINEORRHAPHY, POSSIBLE VAULT SUSPENSION (Uterus)      EXAM UNDER ANESTHESIA (Uterus) Diagnosis: (VAGINAL PROLAPSE, RECTOCELE)    Surgeons: Santosh Maher M.D. Responsible Provider: Migue Hope M.D.    Anesthesia Type: general ASA Status: 1            Final Anesthesia Type: general  Last vitals  BP   Blood Pressure: (P) 121/64    Temp   36.4 °C (97.5 °F)    Pulse   (!) (P) 107   Resp   (P) 20    SpO2   (P) 99 %      Anesthesia Post Evaluation    Patient location during evaluation: PACU  Patient participation: complete - patient participated  Level of consciousness: awake and alert  Pain score: 3    Airway patency: patent  Anesthetic complications: no  Cardiovascular status: hemodynamically stable  Respiratory status: acceptable  Hydration status: euvolemic    PONV: none          No notable events documented.     Nurse Pain Score: 0 (NPRS)

## 2024-01-11 ENCOUNTER — TELEPHONE (OUTPATIENT)
Dept: OBGYN | Facility: CLINIC | Age: 59
End: 2024-01-11
Payer: COMMERCIAL

## 2024-01-11 NOTE — TELEPHONE ENCOUNTER
Returned patient VM regarding post op concerns. PT states she is experiencing rectal discharge, looks like clear gel with no odor. Also states vaginal bleeding which subsided within three days of surgery but has come back heavier. PT admits to going through only one pad a day.   Denies fever or chills  Encounter routed to Dr. Maher. Will consult with him in office as well.

## 2024-01-25 SDOH — ECONOMIC STABILITY: INCOME INSECURITY: IN THE LAST 12 MONTHS, WAS THERE A TIME WHEN YOU WERE NOT ABLE TO PAY THE MORTGAGE OR RENT ON TIME?: NO

## 2024-01-25 SDOH — HEALTH STABILITY: MENTAL HEALTH
STRESS IS WHEN SOMEONE FEELS TENSE, NERVOUS, ANXIOUS, OR CAN'T SLEEP AT NIGHT BECAUSE THEIR MIND IS TROUBLED. HOW STRESSED ARE YOU?: PATIENT DECLINED

## 2024-01-25 SDOH — HEALTH STABILITY: PHYSICAL HEALTH: ON AVERAGE, HOW MANY MINUTES DO YOU ENGAGE IN EXERCISE AT THIS LEVEL?: 150+ MIN

## 2024-01-25 SDOH — HEALTH STABILITY: PHYSICAL HEALTH: ON AVERAGE, HOW MANY DAYS PER WEEK DO YOU ENGAGE IN MODERATE TO STRENUOUS EXERCISE (LIKE A BRISK WALK)?: 5 DAYS

## 2024-01-25 SDOH — ECONOMIC STABILITY: TRANSPORTATION INSECURITY
IN THE PAST 12 MONTHS, HAS LACK OF TRANSPORTATION KEPT YOU FROM MEETINGS, WORK, OR FROM GETTING THINGS NEEDED FOR DAILY LIVING?: NO

## 2024-01-25 SDOH — ECONOMIC STABILITY: HOUSING INSECURITY
IN THE LAST 12 MONTHS, WAS THERE A TIME WHEN YOU DID NOT HAVE A STEADY PLACE TO SLEEP OR SLEPT IN A SHELTER (INCLUDING NOW)?: NO

## 2024-01-25 SDOH — ECONOMIC STABILITY: FOOD INSECURITY: WITHIN THE PAST 12 MONTHS, YOU WORRIED THAT YOUR FOOD WOULD RUN OUT BEFORE YOU GOT MONEY TO BUY MORE.: NEVER TRUE

## 2024-01-25 SDOH — ECONOMIC STABILITY: TRANSPORTATION INSECURITY
IN THE PAST 12 MONTHS, HAS THE LACK OF TRANSPORTATION KEPT YOU FROM MEDICAL APPOINTMENTS OR FROM GETTING MEDICATIONS?: NO

## 2024-01-25 SDOH — ECONOMIC STABILITY: INCOME INSECURITY: HOW HARD IS IT FOR YOU TO PAY FOR THE VERY BASICS LIKE FOOD, HOUSING, MEDICAL CARE, AND HEATING?: NOT HARD AT ALL

## 2024-01-25 SDOH — ECONOMIC STABILITY: FOOD INSECURITY: WITHIN THE PAST 12 MONTHS, THE FOOD YOU BOUGHT JUST DIDN'T LAST AND YOU DIDN'T HAVE MONEY TO GET MORE.: NEVER TRUE

## 2024-01-25 SDOH — ECONOMIC STABILITY: HOUSING INSECURITY: IN THE LAST 12 MONTHS, HOW MANY PLACES HAVE YOU LIVED?: 1

## 2024-01-25 SDOH — ECONOMIC STABILITY: TRANSPORTATION INSECURITY
IN THE PAST 12 MONTHS, HAS LACK OF RELIABLE TRANSPORTATION KEPT YOU FROM MEDICAL APPOINTMENTS, MEETINGS, WORK OR FROM GETTING THINGS NEEDED FOR DAILY LIVING?: NO

## 2024-01-25 ASSESSMENT — SOCIAL DETERMINANTS OF HEALTH (SDOH)
HOW OFTEN DO YOU ATTEND CHURCH OR RELIGIOUS SERVICES?: NEVER
HOW MANY DRINKS CONTAINING ALCOHOL DO YOU HAVE ON A TYPICAL DAY WHEN YOU ARE DRINKING: 1 OR 2
HOW OFTEN DO YOU ATTEND CHURCH OR RELIGIOUS SERVICES?: NEVER
IN A TYPICAL WEEK, HOW MANY TIMES DO YOU TALK ON THE PHONE WITH FAMILY, FRIENDS, OR NEIGHBORS?: MORE THAN THREE TIMES A WEEK
HOW OFTEN DO YOU HAVE SIX OR MORE DRINKS ON ONE OCCASION: NEVER
WITHIN THE PAST 12 MONTHS, YOU WORRIED THAT YOUR FOOD WOULD RUN OUT BEFORE YOU GOT THE MONEY TO BUY MORE: NEVER TRUE
DO YOU BELONG TO ANY CLUBS OR ORGANIZATIONS SUCH AS CHURCH GROUPS UNIONS, FRATERNAL OR ATHLETIC GROUPS, OR SCHOOL GROUPS?: NO
HOW OFTEN DO YOU ATTENT MEETINGS OF THE CLUB OR ORGANIZATION YOU BELONG TO?: NEVER
DO YOU BELONG TO ANY CLUBS OR ORGANIZATIONS SUCH AS CHURCH GROUPS UNIONS, FRATERNAL OR ATHLETIC GROUPS, OR SCHOOL GROUPS?: NO
HOW OFTEN DO YOU ATTENT MEETINGS OF THE CLUB OR ORGANIZATION YOU BELONG TO?: NEVER
HOW OFTEN DO YOU HAVE A DRINK CONTAINING ALCOHOL: MONTHLY OR LESS
IN A TYPICAL WEEK, HOW MANY TIMES DO YOU TALK ON THE PHONE WITH FAMILY, FRIENDS, OR NEIGHBORS?: MORE THAN THREE TIMES A WEEK
HOW HARD IS IT FOR YOU TO PAY FOR THE VERY BASICS LIKE FOOD, HOUSING, MEDICAL CARE, AND HEATING?: NOT HARD AT ALL
HOW OFTEN DO YOU GET TOGETHER WITH FRIENDS OR RELATIVES?: MORE THAN THREE TIMES A WEEK
HOW OFTEN DO YOU GET TOGETHER WITH FRIENDS OR RELATIVES?: MORE THAN THREE TIMES A WEEK

## 2024-01-25 ASSESSMENT — LIFESTYLE VARIABLES
HOW MANY STANDARD DRINKS CONTAINING ALCOHOL DO YOU HAVE ON A TYPICAL DAY: 1 OR 2
HOW OFTEN DO YOU HAVE A DRINK CONTAINING ALCOHOL: MONTHLY OR LESS
HOW OFTEN DO YOU HAVE SIX OR MORE DRINKS ON ONE OCCASION: NEVER
AUDIT-C TOTAL SCORE: 1
SKIP TO QUESTIONS 9-10: 1

## 2024-01-26 ENCOUNTER — APPOINTMENT (OUTPATIENT)
Dept: MEDICAL GROUP | Facility: MEDICAL CENTER | Age: 59
End: 2024-01-26
Payer: COMMERCIAL

## 2024-02-11 DIAGNOSIS — M25.50 ARTHRALGIA, UNSPECIFIED JOINT: ICD-10-CM

## 2024-02-12 RX ORDER — CYCLOBENZAPRINE HCL 5 MG
5 TABLET ORAL 3 TIMES DAILY PRN
Qty: 90 TABLET | Refills: 0 | Status: SHIPPED | OUTPATIENT
Start: 2024-02-12 | End: 2024-03-15 | Stop reason: SDUPTHER

## 2024-02-12 NOTE — TELEPHONE ENCOUNTER
Received request via: Patient    Was the patient seen in the last year in this department? Yes    Does the patient have an active prescription (recently filled or refills available) for medication(s) requested? No: called [facility] pharmacy, they do not have cyclobenzaprine on file for patient    Pharmacy Name: [facility]    Does the patient have [facility] Plus and need 100 day supply (blood pressure, diabetes and cholesterol meds only)? Patient does not have SCP

## 2024-02-15 NOTE — PROGRESS NOTES
"Urogynecology and Pelvic Reconstructive Surgery Follow Up    Courtney Gupta MRN:[id number] :1965    Referred by: Gale Davis MD    Reason for Visit:   Chief Complaint   Patient presents with    Other     Post Op          Subjective     History of Presenting Illness:    Courtney Gupta is a 58 y.o. year old P3 who presents for follow up 6 weeks s/p SSLF/MA/PNR for prolapse    She has noticed significant proved meant with her rectal emptying as well as vaginal bulge symptoms.  She has had some pain and soreness during the recovery, especially right buttock pain consistent with sacrospinous ligament irritation.  However, this is slowly improving with time.  She already takes Flexeril for other musculoskeletal issues and this helps with some of her pelvic pain as well        Initial HPI: She was referred by Dr. Davis for the evaluation and management of prolapse.      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  She has a splint on her perineum and noted to have bowel movements frequently    She also gets kidney stones and was referred to urology at her recent PCP visit (9/15)    She has suspected Crohn's, not confirmed. Also has UC, just takes omeprazole, no immusuppression.       Prior Pelvic surgery:    abdominal hysterectomy, no mesh  Multiple other laparotomies for fibroids/cysts, unclear  Cholecystectomy  2024 Sacrospinous colpopexy, posterior repair, enterocele repair, perieorrhaphy (en)     Prior treatment:   Kegels and online pelvic PT       Pelvic floor symptom review:     Bladder:   Voids per day: 2-3 Voids per night: 1      Urinary incontinence episodes per day: none    Bladder emptying: Complete   Voiding symptoms: None   UTI in last 12 months: None   Other urologic history: Stones      Prolapse:     Prolapse symptoms: resolved     Bowel: "    Constipation: Yes - not too bothersome   Bowel movements per day: 1-2    Straining to empty bowels: Yes--> improved   Splinting to evacuate: Yes--> resolved   Painful evacuation: Yes   Difficulty emptying rectum: No    Incontinence to stool: No   Blood in stool: No    Hemorrhoids: No    Bowel conditions: Ulcerative Colitis   Most recent colonoscopy: scheduled 10/2023       Sexual function:    Sexually active: No     Gender of partners: Male   Pain with intercourse: No            Past medical and surgical history    Past obstetric history   Number of vaginal deliveries: 3   Number of  deliveries: 0   History of vacuum/forceps: No    History of obstetric anal sphincter injury: Yes    Past gynecological history:    Last menstrual period: No LMP recorded. Patient has had a hysterectomy.       Past medical history:  Past Medical History:   Diagnosis Date    Anesthesia 2023    Pt states she was given narcotics prior to one of her surgeries which made her sick and she aspirated during the surgery.    Fibromyalgia     GERD (gastroesophageal reflux disease)     Hypertension      Past surgical history:  Past Surgical History:   Procedure Laterality Date    [place] POST COLPORRHAPHY,RECTUM/VAGINA N/A 2024    Procedure: PELVIC EXAM UNDER ANESTHESIA, POSTERIOR REPAIR, PERINEORRHAPHY, VAULT SUSPENSION;  Surgeon: Santosh Maher M.D.;  Location: SURGERY SAME DAY [facility];  Service: Gynecology    EXAM UNDER ANESTHESIA N/A 2024    Procedure: EXAM UNDER ANESTHESIA;  Surgeon: Santosh Maher M.D.;  Location: SURGERY SAME DAY [facility];  Service: Gynecology    CHOLECYSTECTOMY      LUMPECTOMY      VAGINAL HYSTERECTOMY TOTAL      Hysterectomy,Total Vaginal     Medications:has a current medication list which includes the following prescription(s): cyclobenzaprine, non formulary request, multiple vitamins-minerals, omeprazole, duloxetine, gavilyte-g, estradiol, and losartan.  Allergies:Other drug  Family history:  Family  History   Problem Relation Age of Onset    No Known Problems Mother     Heart Disease Father     Diabetes Father      Social history: reports that she has never smoked. She has never used smokeless tobacco. She reports current alcohol use. She reports that she does not use drugs.    Review of systems: A full review of systems was performed, and negative with the exception of want is noted above in the HPI.        Objective        There were no vitals taken for this visit.    Physical Exam  Vitals reviewed. Exam conducted with a chaperone present (MA - see notes.).   Constitutional:       Appearance: Normal appearance.   HENT:      Head: Normocephalic.      Mouth/Throat:      Mouth: Mucous membranes are moist.   Cardiovascular:      Rate and Rhythm: Normal rate.   Pulmonary:      Effort: Pulmonary effort is normal.   Abdominal:      Palpations: Abdomen is soft. There is no mass.      Tenderness: There is no abdominal tenderness.   Skin:     General: Skin is warm and dry.   Neurological:      Mental Status: She is alert.   Psychiatric:         Mood and Affect: Mood normal.       Genitourinary:    External female genitalia: WNL   Vulva: WNL   Bulbocavernosus reflex: Intact   Anal wink reflex: Intact   Perineal sensation: WNL   Urethra: Atrophic   Vagina: Atrophic   Atrophy: Moderate   Cough stress test: Negative    Pelvic floor: (Postop)   POP-Q: Aa -3 / Ba -3 / TVL 8 / C -6.5 / D X / Ap -3 / Bp -3 / GH 4.5 / PB 2   Some levator hypertonicity noted, especially on the left/puborectalis minimal sacrospinous tenderness    Procedure Performed: No    Diagnostic test and records review:    Labs: n/a    Radiology: n/a    Documentation reviewed: Prior EMR Records           Assessment & Plan     Courtney Gupta is a 58 y.o. year old P3 with prolapse, now postop s/p native tissue repair    1. Rectocele  2. Perineocele  3. Vaginal vault prolapse  4. Outlet dysfunction constipation  -S/p sacrospinous vault suspension, posterior  pair/perineorrhaphy.  Resolution of prolapse symptoms and improved bowel emptying.  Some residual pelvic floor soreness/pain with hypertonicity noted on exam today.  -Recommended pelvic floor physical therapy to address pelvic tone, sent to [facility] -she is cleared for any internal physical therapy, although some PDS sutures may still be palpable for the next 1 to 2 months        5. Atrophic vaginitis  Scribed vaginal estrogen today.  This was previously covered by her insurance, but she has changed insurance and would like it resent.  Use twice weekly      F/u as needed           Santosh Maher MD, FACOG  Urogynecology and Pelvic Reconstructive Surgery  Department of Obstetrics and Gynecology  [facility]        This medical record contains text that has been entered with the assistance of computer voice recognition and dictation software.  Therefore, it may contain unintended errors in text, spelling, punctuation, or grammar

## 2024-02-16 ENCOUNTER — GYNECOLOGY VISIT (OUTPATIENT)
Dept: GYNECOLOGY | Facility: CLINIC | Age: 59
End: 2024-02-16
Payer: COMMERCIAL

## 2024-02-16 VITALS
WEIGHT: 138 LBS | HEART RATE: 81 BPM | BODY MASS INDEX: 23.69 KG/M2 | DIASTOLIC BLOOD PRESSURE: 81 MMHG | SYSTOLIC BLOOD PRESSURE: 141 MMHG

## 2024-02-16 DIAGNOSIS — M62.838 LEVATOR SPASM: ICD-10-CM

## 2024-02-16 DIAGNOSIS — Z98.890 POSTOPERATIVE STATE: ICD-10-CM

## 2024-02-16 DIAGNOSIS — N95.2 ATROPHIC VAGINITIS: ICD-10-CM

## 2024-02-16 PROCEDURE — 99024 POSTOP FOLLOW-UP VISIT: CPT | Performed by: STUDENT IN AN ORGANIZED HEALTH CARE EDUCATION/TRAINING PROGRAM

## 2024-02-16 RX ORDER — ESTRADIOL 0.1 MG/G
CREAM VAGINAL
Qty: 1 EACH | Refills: 6 | Status: SHIPPED | OUTPATIENT
Start: 2024-02-16

## 2024-02-16 ASSESSMENT — FIBROSIS 4 INDEX: FIB4 SCORE: 0.53

## 2024-02-16 NOTE — PROGRESS NOTES
Patient here for Post Op Exam  Surgery Date: 01/05/2024  PT States no complications or concerns   PVR : Unable to perform due to extreme tenderness   Good #: [phone] (home)   Pharmacy Verified

## 2024-02-19 DIAGNOSIS — M62.838 LEVATOR SPASM: ICD-10-CM

## 2024-03-15 DIAGNOSIS — M25.50 ARTHRALGIA, UNSPECIFIED JOINT: ICD-10-CM

## 2024-03-15 RX ORDER — CYCLOBENZAPRINE HCL 5 MG
5 TABLET ORAL 3 TIMES DAILY PRN
Qty: 90 TABLET | Refills: 0 | Status: SHIPPED | OUTPATIENT
Start: 2024-03-15

## 2024-03-15 NOTE — TELEPHONE ENCOUNTER
Received request via: Pharmacy    Was the patient seen in the last year in this department? Yes    Does the patient have an active prescription (recently filled or refills available) for medication(s) requested? No    Pharmacy Name: [facility] [place], [place]    Does the patient have [facility] Plus and need 100 day supply (blood pressure, diabetes and cholesterol meds only)? Patient does not have SCP

## 2024-05-14 NOTE — PROGRESS NOTES
"Urogynecology and Pelvic Reconstructive Surgery Follow Up    Courtney Gupta [id number] :1965    Referred by: Gale Davis MD    Reason for Visit:   No chief complaint on file.        Subjective     History of Presenting Illness:    Courtney Gupta is a 58 y.o. year old P3 who presents for follow up 4 months s/p SSLF/NE/PNR for prolapse    She previously noted noticed significant improvement with her rectal emptying as well as vaginal bulge symptoms.   ***She has had some pain and soreness during the recovery, especially right buttock pain consistent with sacrospinous ligament irritation.  However, this is slowly improving with time.  She already takes Flexeril for other musculoskeletal issues and this helps with some of her pelvic pain as well        Initial HPI: She was referred by Dr. Davis for the evaluation and management of prolapse.      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  She has a splint on her perineum and noted to have bowel movements frequently    She also gets kidney stones and was referred to urology at her recent PCP visit (9/15)    She has suspected Crohn's, not confirmed. Also has UC, just takes omeprazole, no immusuppression.       Prior Pelvic surgery:    abdominal hysterectomy, no mesh  Multiple other laparotomies for fibroids/cysts, unclear  Cholecystectomy  2024 Sacrospinous colpopexy, posterior repair, enterocele repair, perieorrhaphy (en)     Prior treatment:   Kegels and online pelvic PT       Pelvic floor symptom review:     Bladder:   Voids per day: 2-3 Voids per night: 1      Urinary incontinence episodes per day: none    Bladder emptying: Complete   Voiding symptoms: None   UTI in last 12 months: None   Other urologic history: Stones      Prolapse:     Prolapse symptoms: resolved     Bowel:    Constipation: Yes - not too " bothersome   Bowel movements per day: 1-2    Straining to empty bowels: Yes--> improved   Splinting to evacuate: Yes--> resolved   Painful evacuation: Yes   Difficulty emptying rectum: No    Incontinence to stool: No   Blood in stool: No    Hemorrhoids: No    Bowel conditions: Ulcerative Colitis   Most recent colonoscopy: scheduled 10/2023       Sexual function:    Sexually active: No     Gender of partners: Male   Pain with intercourse: No            Past medical and surgical history    Past obstetric history   Number of vaginal deliveries: 3   Number of  deliveries: 0   History of vacuum/forceps: No    History of obstetric anal sphincter injury: Yes    Past gynecological history:    Last menstrual period: No LMP recorded. Patient has had a hysterectomy.       Past medical history:  Past Medical History:   Diagnosis Date    Anesthesia 2023    Pt states she was given narcotics prior to one of her surgeries which made her sick and she aspirated during the surgery.    Fibromyalgia     GERD (gastroesophageal reflux disease)     Hypertension      Past surgical history:  Past Surgical History:   Procedure Laterality Date    [place] POST COLPORRHAPHY,RECTUM/VAGINA N/A 2024    Procedure: PELVIC EXAM UNDER ANESTHESIA, POSTERIOR REPAIR, PERINEORRHAPHY, VAULT SUSPENSION;  Surgeon: Santosh Maher M.D.;  Location: SURGERY SAME DAY [facility];  Service: Gynecology    EXAM UNDER ANESTHESIA N/A 2024    Procedure: EXAM UNDER ANESTHESIA;  Surgeon: Santosh Maher M.D.;  Location: SURGERY SAME DAY [facility];  Service: Gynecology    CHOLECYSTECTOMY      LUMPECTOMY      VAGINAL HYSTERECTOMY TOTAL      Hysterectomy,Total Vaginal     Medications:has a current medication list which includes the following prescription(s): cyclobenzaprine, estradiol, non formulary request, multiple vitamins-minerals, omeprazole, duloxetine, gavilyte-g, and losartan.  Allergies:Other drug  Family history:  Family History   Problem Relation Age of  Onset    No Known Problems Mother     Heart Disease Father     Diabetes Father      Social history: reports that she has never smoked. She has never used smokeless tobacco. She reports current alcohol use. She reports that she does not use drugs.    Review of systems: A full review of systems was performed, and negative with the exception of want is noted above in the HPI.        Objective        There were no vitals taken for this visit.    Physical Exam  Vitals reviewed. Exam conducted with a chaperone present (MA - see notes.).   Constitutional:       Appearance: Normal appearance.   HENT:      Head: Normocephalic.      Mouth/Throat:      Mouth: Mucous membranes are moist.   Cardiovascular:      Rate and Rhythm: Normal rate.   Pulmonary:      Effort: Pulmonary effort is normal.   Abdominal:      Palpations: Abdomen is soft. There is no mass.      Tenderness: There is no abdominal tenderness.   Skin:     General: Skin is warm and dry.   Neurological:      Mental Status: She is alert.   Psychiatric:         Mood and Affect: Mood normal.         Genitourinary:    External female genitalia: WNL   Vulva: WNL   Bulbocavernosus reflex: Intact   Anal wink reflex: Intact   Perineal sensation: WNL   Urethra: Atrophic   Vagina: Atrophic   Atrophy: Moderate   Cough stress test: Negative    Pelvic floor: (Postop)   POP-Q: Aa -3 / Ba -3 / TVL 8 / C -6.5 / D X / Ap -3 / Bp -3 / GH 4.5 / PB 2   Some levator hypertonicity noted, especially on the left/puborectalis minimal sacrospinous tenderness    Procedure Performed: No    Diagnostic test and records review:    Labs: n/a    Radiology: n/a    Documentation reviewed: Prior EMR Records           Assessment & Plan     Courtney Gupta is a 58 y.o. year old P3 with prolapse, now postop s/p native tissue repair    1. Rectocele  2. Perineocele  3. Vaginal vault prolapse  4. Outlet dysfunction constipation  -S/p sacrospinous vault suspension, posterior pair/perineorrhaphy.  Resolution  of prolapse symptoms and improved bowel emptying.  Some residual pelvic floor soreness/pain with hypertonicity noted on exam today.  -Recommended pelvic floor physical therapy to address pelvic tone, sent to [facility] -she is cleared for any internal physical therapy, although some PDS sutures may still be palpable for the next 1 to 2 months        5. Atrophic vaginitis  Scribed vaginal estrogen today.  This was previously covered by her insurance, but she has changed insurance and would like it resent.  Use twice weekly      F/u as needed           Santosh Maher MD, FACOG  Urogynecology and Pelvic Reconstructive Surgery  Department of Obstetrics and Gynecology  [facility]        This medical record contains text that has been entered with the assistance of computer voice recognition and dictation software.  Therefore, it may contain unintended errors in text, spelling, punctuation, or grammar

## 2024-05-16 ENCOUNTER — APPOINTMENT (OUTPATIENT)
Dept: GYNECOLOGY | Facility: CLINIC | Age: 59
End: 2024-05-16
Payer: COMMERCIAL

## 2024-06-12 DIAGNOSIS — M25.50 ARTHRALGIA, UNSPECIFIED JOINT: ICD-10-CM

## 2024-06-12 NOTE — TELEPHONE ENCOUNTER
Received request via: Pharmacy    Was the patient seen in the last year in this department? Yes    Does the patient have an active prescription (recently filled or refills available) for medication(s) requested? No    Pharmacy Name: [facility]    Does the patient have [facility] Plus and need 100 day supply (blood pressure, diabetes and cholesterol meds only)? Patient does not have SCP

## 2024-06-13 RX ORDER — CYCLOBENZAPRINE HCL 5 MG
5 TABLET ORAL 3 TIMES DAILY PRN
Qty: 90 TABLET | Refills: 0 | Status: SHIPPED | OUTPATIENT
Start: 2024-06-13

## 2024-08-05 DIAGNOSIS — M25.50 ARTHRALGIA, UNSPECIFIED JOINT: ICD-10-CM

## 2024-08-05 RX ORDER — CYCLOBENZAPRINE HCL 5 MG
5 TABLET ORAL 3 TIMES DAILY PRN
Qty: 90 TABLET | Refills: 0 | Status: SHIPPED | OUTPATIENT
Start: 2024-08-05

## 2024-08-05 NOTE — TELEPHONE ENCOUNTER
Received request via: Patient    Was the patient seen in the last year in this department? Yes    Does the patient have an active prescription (recently filled or refills available) for medication(s) requested? No    [facility]    Does the patient have [facility] and need 100 day supply (blood pressure, diabetes and cholesterol meds only)? Patient does not have SCP

## 2024-09-06 DIAGNOSIS — M25.50 ARTHRALGIA, UNSPECIFIED JOINT: ICD-10-CM

## 2024-09-06 RX ORDER — CYCLOBENZAPRINE HCL 5 MG
5 TABLET ORAL 3 TIMES DAILY PRN
Qty: 90 TABLET | Refills: 0 | Status: SHIPPED | OUTPATIENT
Start: 2024-09-06

## 2024-09-06 NOTE — TELEPHONE ENCOUNTER
Received request via: Patient    Was the patient seen in the last year in this department? Yes    Does the patient have an active prescription (recently filled or refills available) for medication(s) requested? No  ''''Pt is out of medication '  Pharmacy Name:   [facility]/pharmacy #[id number] - [place], [place] - [address]

## 2024-11-26 ENCOUNTER — APPOINTMENT (OUTPATIENT)
Dept: TELEHEALTH | Facility: TELEMEDICINE | Age: 59
End: 2024-11-26
Payer: COMMERCIAL

## 2024-11-26 ENCOUNTER — TELEPHONE (OUTPATIENT)
Dept: RADIOLOGY | Facility: IMAGING CENTER | Age: 59
End: 2024-11-26

## 2024-11-27 ENCOUNTER — TELEMEDICINE (OUTPATIENT)
Dept: MEDICAL GROUP | Facility: CLINIC | Age: 59
End: 2024-11-27
Payer: COMMERCIAL

## 2024-11-27 NOTE — PROGRESS NOTES
Patient was not connected to video when I joined.  Tried calling and getting her back on video x3.     Gave up on video to do telephone visit and over the course of 1h called patient 8 times and MA reached out through [employer] with no success at reaching her.  Will close visit and advise urgent care if she calls back.

## 2025-01-20 ENCOUNTER — TELEPHONE (OUTPATIENT)
Dept: MEDICAL GROUP | Facility: MEDICAL CENTER | Age: 60
End: 2025-01-20
Payer: COMMERCIAL

## 2025-01-21 NOTE — TELEPHONE ENCOUNTER
Pt called and lvm asking if Hansa is taking new pts, I called her back and lvm stating [facility]a is booking until march, to please call back at [phone] for an appt

## 2025-01-22 ENCOUNTER — OFFICE VISIT (OUTPATIENT)
Dept: MEDICAL GROUP | Facility: CLINIC | Age: 60
End: 2025-01-22
Payer: COMMERCIAL

## 2025-01-22 VITALS
SYSTOLIC BLOOD PRESSURE: 178 MMHG | DIASTOLIC BLOOD PRESSURE: 105 MMHG | HEART RATE: 86 BPM | RESPIRATION RATE: 16 BRPM | OXYGEN SATURATION: 96 %

## 2025-01-22 DIAGNOSIS — E04.2 MULTIPLE THYROID NODULES: ICD-10-CM

## 2025-01-22 DIAGNOSIS — R53.82 CHRONIC FATIGUE: ICD-10-CM

## 2025-01-22 DIAGNOSIS — I10 HYPERTENSION, UNSPECIFIED TYPE: ICD-10-CM

## 2025-01-22 PROCEDURE — 3080F DIAST BP >= 90 MM HG: CPT

## 2025-01-22 PROCEDURE — 99214 OFFICE O/P EST MOD 30 MIN: CPT

## 2025-01-22 PROCEDURE — 3077F SYST BP >= 140 MM HG: CPT

## 2025-01-22 RX ORDER — LOSARTAN POTASSIUM 100 MG/1
100 TABLET ORAL DAILY
Qty: 90 TABLET | Refills: 3 | Status: SHIPPED | OUTPATIENT
Start: 2025-01-22

## 2025-01-22 RX ORDER — LOSARTAN POTASSIUM 100 MG/1
50 TABLET ORAL DAILY
Qty: 90 TABLET | Refills: 3 | Status: SHIPPED | OUTPATIENT
Start: 2025-01-22 | End: 2025-01-22

## 2025-01-23 PROBLEM — E04.2 MULTIPLE THYROID NODULES: Status: ACTIVE | Noted: 2025-01-23

## 2025-01-23 NOTE — PROGRESS NOTES
This note is formatted in an APSO format, for additional subjective and objective evaluation please scroll to the bottom of the note.    CC:  Chief Complaint   Patient presents with    Hypertension       Assessment/Plan:  Problem List Items Addressed This Visit       Chronic fatigue    Relevant Orders    VITAMIN D,25 HYDROXY (DEFICIENCY)    Hypertension     Was on losartan previously. Will restart, now at 100. Will likely need second agent. Labs ordered.         Relevant Medications    losartan (COZAAR) 100 MG Tab    Other Relevant Orders    CBC WITHOUT DIFFERENTIAL    Comp Metabolic Panel    TSH WITH REFLEX TO FT4    Lipid Profile    Multiple thyroid nodules     Noted on us in the past. Pt was lost to follow up.         Relevant Orders    Referral to ENT    TSH WITH REFLEX TO FT4      Orders Placed This Encounter    CBC WITHOUT DIFFERENTIAL    Comp Metabolic Panel    TSH WITH REFLEX TO FT4    Lipid Profile    VITAMIN D,25 HYDROXY (DEFICIENCY)    Referral to ENT    DISCONTD: losartan (COZAAR) 100 MG Tab    losartan (COZAAR) 100 MG Tab       HISTORY OF PRESENT ILLNESS:       Problem   Multiple Thyroid Nodules   Hypertension         Exam:    BP (!) 178/105 (BP Location: Left arm, Patient Position: Sitting, BP Cuff Size: Adult)   Pulse 86   Resp 16   SpO2 96%  There is no height or weight on file to calculate BMI.    Gen: Well appearing. No apparent distress. Well developed. Sitting comfortably on exam table  HEENT: NCAT, MMM  Neck: Supple, FROM  Chest: No deformities, Equal chest expansion  Lungs: Normal effort, CTA bilaterally.  CV: Regular rate and rhythm. Pulse palpable. No murmur  Abd: Non-distended. Small well healing scab just to the right inferior of umbilicus.  Ext: No cyanosis. No edema.  Skin: Warm/dry. No visible rashes.  Neuro: Non-focal. A&Ox4.  Psych: Normal behavior, normal affect    No follow-ups on file.    Roman Monet MD  [facility]

## 2025-01-24 NOTE — ASSESSMENT & PLAN NOTE
Was on losartan previously. Will restart, now at 100. Will likely need second agent. Labs ordered.

## 2025-01-25 ENCOUNTER — HOSPITAL ENCOUNTER (OUTPATIENT)
Dept: LAB | Facility: MEDICAL CENTER | Age: 60
End: 2025-01-25
Payer: COMMERCIAL

## 2025-01-25 DIAGNOSIS — I10 HYPERTENSION, UNSPECIFIED TYPE: ICD-10-CM

## 2025-01-25 DIAGNOSIS — E04.2 MULTIPLE THYROID NODULES: ICD-10-CM

## 2025-01-25 DIAGNOSIS — R53.82 CHRONIC FATIGUE: ICD-10-CM

## 2025-01-25 LAB
25(OH)D3 SERPL-MCNC: 59 NG/ML (ref 30–100)
ALBUMIN SERPL BCP-MCNC: 4.4 G/DL (ref 3.2–4.9)
ALBUMIN/GLOB SERPL: 1.4 G/DL
ALP SERPL-CCNC: 65 U/L (ref 30–99)
ALT SERPL-CCNC: 10 U/L (ref 2–50)
ANION GAP SERPL CALC-SCNC: 10 MMOL/L (ref 7–16)
AST SERPL-CCNC: 8 U/L (ref 12–45)
BILIRUB SERPL-MCNC: 0.7 MG/DL (ref 0.1–1.5)
BUN SERPL-MCNC: 22 MG/DL (ref 8–22)
CALCIUM ALBUM COR SERPL-MCNC: 9.4 MG/DL (ref 8.5–10.5)
CALCIUM SERPL-MCNC: 9.7 MG/DL (ref 8.5–10.5)
CHLORIDE SERPL-SCNC: 106 MMOL/L (ref 96–112)
CHOLEST SERPL-MCNC: 164 MG/DL (ref 100–199)
CO2 SERPL-SCNC: 27 MMOL/L (ref 20–33)
CREAT SERPL-MCNC: 0.81 MG/DL (ref 0.5–1.4)
ERYTHROCYTE [DISTWIDTH] IN BLOOD BY AUTOMATED COUNT: 43.6 FL (ref 35.9–50)
GFR SERPLBLD CREATININE-BSD FMLA CKD-EPI: 83 ML/MIN/1.73 M 2
GLOBULIN SER CALC-MCNC: 3.2 G/DL (ref 1.9–3.5)
GLUCOSE SERPL-MCNC: 96 MG/DL (ref 65–99)
HCT VFR BLD AUTO: 47.8 % (ref 37–47)
HDLC SERPL-MCNC: 49 MG/DL
HGB BLD-MCNC: 15.6 G/DL (ref 12–16)
LDLC SERPL CALC-MCNC: 99 MG/DL
MCH RBC QN AUTO: 29.4 PG (ref 27–33)
MCHC RBC AUTO-ENTMCNC: 32.6 G/DL (ref 32.2–35.5)
MCV RBC AUTO: 90.2 FL (ref 81.4–97.8)
PLATELET # BLD AUTO: 300 K/UL (ref 164–446)
PMV BLD AUTO: 10.4 FL (ref 9–12.9)
POTASSIUM SERPL-SCNC: 4.1 MMOL/L (ref 3.6–5.5)
PROT SERPL-MCNC: 7.6 G/DL (ref 6–8.2)
RBC # BLD AUTO: 5.3 M/UL (ref 4.2–5.4)
SODIUM SERPL-SCNC: 143 MMOL/L (ref 135–145)
TRIGL SERPL-MCNC: 81 MG/DL (ref 0–149)
TSH SERPL DL<=0.005 MIU/L-ACNC: 2.06 UIU/ML (ref 0.38–5.33)
WBC # BLD AUTO: 4.6 K/UL (ref 4.8–10.8)

## 2025-01-25 PROCEDURE — 36415 COLL VENOUS BLD VENIPUNCTURE: CPT

## 2025-01-25 PROCEDURE — 84443 ASSAY THYROID STIM HORMONE: CPT

## 2025-01-25 PROCEDURE — 80061 LIPID PANEL: CPT

## 2025-01-25 PROCEDURE — 80053 COMPREHEN METABOLIC PANEL: CPT

## 2025-01-25 PROCEDURE — 85027 COMPLETE CBC AUTOMATED: CPT

## 2025-01-25 PROCEDURE — 82306 VITAMIN D 25 HYDROXY: CPT

## 2025-01-27 ENCOUNTER — TELEPHONE (OUTPATIENT)
Dept: MEDICAL GROUP | Facility: CLINIC | Age: 60
End: 2025-01-27
Payer: COMMERCIAL

## 2025-01-27 NOTE — TELEPHONE ENCOUNTER
"Caller Name: Courtney Gupta   Call Back Number: [phone]     How would the patient prefer to be contacted with a response: Phone call OK to leave a detailed message    Losartan 100 mg - Dose may be too high. She has felt whoozy, \"felt drugged\", dizzy. She vomited.   The dose she was on in the past she did well on. I looked up the previous dose and is was 50 mg. She asked if she can cut the dose in half in the meantime so a new RX isn't sent. I said, she can do that if she did well in the past, however I would message the doctor so he can give the approval.   I'm marking this urgent as we need permission for her to even cut and take half the dose.     Lab results, when all the labs come in, if an appointment is needed can she get a phone call to set up an appointment? And will MD send a [employer] message if labs are all normal?   "

## 2025-01-30 ENCOUNTER — TELEPHONE (OUTPATIENT)
Dept: MEDICAL GROUP | Facility: CLINIC | Age: 60
End: 2025-01-30
Payer: COMMERCIAL

## 2025-01-30 NOTE — TELEPHONE ENCOUNTER
VOICEMAIL  1. Caller Name: Courtney Gupta                       Call Back Number: [phone]     2. Message: Courtney LM that she's not sure, what's going on with her Cyclobenzaprine refill. She put in the request last week. Please call vs [employer] message. My chart keeps kicking her out.     3. Patient approves office to leave a detailed voicemail/[employer] message: N\A

## 2025-02-04 ENCOUNTER — APPOINTMENT (OUTPATIENT)
Dept: MEDICAL GROUP | Facility: CLINIC | Age: 60
End: 2025-02-04
Payer: COMMERCIAL

## 2025-02-04 VITALS
SYSTOLIC BLOOD PRESSURE: 130 MMHG | HEART RATE: 74 BPM | RESPIRATION RATE: 16 BRPM | HEIGHT: 64 IN | TEMPERATURE: 97.9 F | WEIGHT: 141 LBS | OXYGEN SATURATION: 99 % | DIASTOLIC BLOOD PRESSURE: 80 MMHG | BODY MASS INDEX: 24.07 KG/M2

## 2025-02-04 DIAGNOSIS — M05.80 POLYARTHRITIS WITH POSITIVE RHEUMATOID FACTOR (HCC): ICD-10-CM

## 2025-02-04 DIAGNOSIS — I10 PRIMARY HYPERTENSION: ICD-10-CM

## 2025-02-04 DIAGNOSIS — K21.9 GASTROESOPHAGEAL REFLUX DISEASE, UNSPECIFIED WHETHER ESOPHAGITIS PRESENT: ICD-10-CM

## 2025-02-04 DIAGNOSIS — M25.50 ARTHRALGIA, UNSPECIFIED JOINT: ICD-10-CM

## 2025-02-04 DIAGNOSIS — M13.0 POLYARTHRITIS: ICD-10-CM

## 2025-02-04 DIAGNOSIS — M54.2 NECK PAIN: ICD-10-CM

## 2025-02-04 PROCEDURE — 3075F SYST BP GE 130 - 139MM HG: CPT

## 2025-02-04 PROCEDURE — 99214 OFFICE O/P EST MOD 30 MIN: CPT

## 2025-02-04 PROCEDURE — 3079F DIAST BP 80-89 MM HG: CPT

## 2025-02-04 RX ORDER — AMLODIPINE BESYLATE 5 MG/1
5 TABLET ORAL DAILY
Qty: 100 TABLET | Refills: 3 | Status: SHIPPED | OUTPATIENT
Start: 2025-02-04 | End: 2026-03-11

## 2025-02-04 RX ORDER — CYCLOBENZAPRINE HCL 5 MG
5 TABLET ORAL
Qty: 90 TABLET | Refills: 1 | Status: SHIPPED | OUTPATIENT
Start: 2025-02-04

## 2025-02-04 ASSESSMENT — PATIENT HEALTH QUESTIONNAIRE - PHQ9: CLINICAL INTERPRETATION OF PHQ2 SCORE: 0

## 2025-02-04 ASSESSMENT — FIBROSIS 4 INDEX: FIB4 SCORE: 0.5
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
## 2025-02-04 NOTE — ASSESSMENT & PLAN NOTE
Patient reports pain in multiple joints, all over her body.  This pain has been going on for several years now.  Previously, she had workup which included positive Rh but negative for other inflammatory markers.  We will repeat rheumatology workup and refer to rheum if indicated.  Also x-ray of her shoulder was ordered because patient reports that this is the most painful joint (although only slightly more painful than other joints).  She has been taking Advil daily which helps, however previously Flexeril helped much more.  We will restart the Flexeril, advised patient to not take Advil every day.  Patient reports she does not tolerate opioids.  We may need to look for alternative treatments at some point.  -flexeril  -lab workup  -rheum referral if indicated  -rtc 1 mo to f/u on labs

## 2025-02-04 NOTE — ASSESSMENT & PLAN NOTE
BP improved with losartan 100 but still mildly hypertensive. Adding amlodipine 5.  - amlodipine 5  - cont losartan  - BP log
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
## 2025-02-04 NOTE — ASSESSMENT & PLAN NOTE
Has seen GI multiple times and had scopes. Symptoms continue despite txt with peppermint extract.  - Restart PPI, pt will get omeprazole otc.  - May need to return to GI for another endoscopy to r/o hill's

## 2025-02-26 ENCOUNTER — HOSPITAL ENCOUNTER (OUTPATIENT)
Dept: RADIOLOGY | Facility: MEDICAL CENTER | Age: 60
End: 2025-02-26
Attending: OTOLARYNGOLOGY
Payer: COMMERCIAL

## 2025-02-26 DIAGNOSIS — D44.0 NEOPLASM OF UNCERTAIN BEHAVIOR OF THYROID GLAND: ICD-10-CM

## 2025-02-26 PROCEDURE — 10005 FNA BX W/US GDN 1ST LES: CPT

## 2025-02-26 PROCEDURE — 88173 CYTOPATH EVAL FNA REPORT: CPT | Performed by: PATHOLOGY

## 2025-02-26 NOTE — PROGRESS NOTES
US guided left  thyroid nodule fine needle aspiration done by ASLLIE gaviria; NON-SEDATION (no H&P required as this is a NON SEDATION procedure) left  anterior aspect of neck access site, dressing CDI; 3 samples in 1 jar of cytolyt obtained, 2 samples in 1 vial afirma obtained and sent to lab. Pt tolerated the procedure well. Pt hemodynamically stable pre/intra/post procedure; all questions and concerns answered prior to being d/c; patient provided with appropriate education for procedure; pt d/c home.

## 2025-02-27 LAB — CYTOLOGY REG CYTOL: NORMAL

## 2025-02-28 ENCOUNTER — APPOINTMENT (OUTPATIENT)
Dept: RADIOLOGY | Facility: MEDICAL CENTER | Age: 60
End: 2025-02-28
Payer: COMMERCIAL

## 2025-03-07 ENCOUNTER — TELEPHONE (OUTPATIENT)
Dept: MEDICAL GROUP | Facility: CLINIC | Age: 60
End: 2025-03-07

## 2025-03-08 NOTE — TELEPHONE ENCOUNTER
VOICEMAIL  1. Caller Name: Courtney Gupta                       Call Back Number: [phone]     2. Message: Dr. Monet had ordered imaging for neck and shoulders- she had to put on hold. She had to do a Biopsy procedure for her thyroid. She had to pay $2,000 out of pocket.     3. Patient approves office to leave a detailed voicemail/MyChart message: N\A

## 2025-03-13 DIAGNOSIS — M25.50 ARTHRALGIA, UNSPECIFIED JOINT: ICD-10-CM

## 2025-03-19 NOTE — TELEPHONE ENCOUNTER
Received request via: Pharmacy    Was the patient seen in the last year in this department? Yes    Does the patient have an active prescription (recently filled or refills available) for medication(s) requested? No    Pharmacy Name: [facility]/pharmacy #[id number] - [place] - [address]

## 2025-03-20 RX ORDER — CYCLOBENZAPRINE HCL 5 MG
5 TABLET ORAL
Qty: 90 TABLET | Refills: 1 | Status: SHIPPED | OUTPATIENT
Start: 2025-03-20

## 2025-03-21 ENCOUNTER — TELEPHONE (OUTPATIENT)
Dept: MEDICAL GROUP | Facility: CLINIC | Age: 60
End: 2025-03-21
Payer: COMMERCIAL

## 2025-03-21 NOTE — TELEPHONE ENCOUNTER
VOICEMAIL  1. Caller Name: Courtney Gupta                       Call Back Number: [phone] (home)      2. Message: Called pt to verify if they have received the prescription refill for cyclobenzaperine. Pt did not answer, left a voicemail stating to callback the office.     3. Patient approves office to leave a detailed voicemail/MyChart message: yes

## 2025-04-07 ENCOUNTER — HOSPITAL ENCOUNTER (OUTPATIENT)
Dept: LAB | Facility: MEDICAL CENTER | Age: 60
End: 2025-04-07
Attending: FAMILY MEDICINE
Payer: COMMERCIAL

## 2025-04-07 DIAGNOSIS — M25.50 ARTHRALGIA, UNSPECIFIED JOINT: ICD-10-CM

## 2025-04-07 LAB
BASOPHILS # BLD AUTO: 1.4 % (ref 0–1.8)
BASOPHILS # BLD: 0.08 K/UL (ref 0–0.12)
EOSINOPHIL # BLD AUTO: 0.19 K/UL (ref 0–0.51)
EOSINOPHIL NFR BLD: 3.2 % (ref 0–6.9)
ERYTHROCYTE [DISTWIDTH] IN BLOOD BY AUTOMATED COUNT: 42.4 FL (ref 35.9–50)
EST. AVERAGE GLUCOSE BLD GHB EST-MCNC: 103 MG/DL
HBA1C MFR BLD: 5.2 % (ref 4–5.6)
HCT VFR BLD AUTO: 46.7 % (ref 37–47)
HGB BLD-MCNC: 15.3 G/DL (ref 12–16)
IMM GRANULOCYTES # BLD AUTO: 0.01 K/UL (ref 0–0.11)
IMM GRANULOCYTES NFR BLD AUTO: 0.2 % (ref 0–0.9)
LYMPHOCYTES # BLD AUTO: 1.7 K/UL (ref 1–4.8)
LYMPHOCYTES NFR BLD: 29 % (ref 22–41)
MCH RBC QN AUTO: 28.9 PG (ref 27–33)
MCHC RBC AUTO-ENTMCNC: 32.8 G/DL (ref 32.2–35.5)
MCV RBC AUTO: 88.3 FL (ref 81.4–97.8)
MONOCYTES # BLD AUTO: 0.43 K/UL (ref 0–0.85)
MONOCYTES NFR BLD AUTO: 7.3 % (ref 0–13.4)
NEUTROPHILS # BLD AUTO: 3.46 K/UL (ref 1.82–7.42)
NEUTROPHILS NFR BLD: 58.9 % (ref 44–72)
NRBC # BLD AUTO: 0 K/UL
NRBC BLD-RTO: 0 /100 WBC (ref 0–0.2)
PLATELET # BLD AUTO: 383 K/UL (ref 164–446)
PMV BLD AUTO: 10.2 FL (ref 9–12.9)
RBC # BLD AUTO: 5.29 M/UL (ref 4.2–5.4)
WBC # BLD AUTO: 5.9 K/UL (ref 4.8–10.8)

## 2025-04-07 PROCEDURE — 82679 ASSAY OF ESTRONE: CPT

## 2025-04-07 PROCEDURE — 84270 ASSAY OF SEX HORMONE GLOBUL: CPT

## 2025-04-07 PROCEDURE — 36415 COLL VENOUS BLD VENIPUNCTURE: CPT

## 2025-04-07 PROCEDURE — 84403 ASSAY OF TOTAL TESTOSTERONE: CPT

## 2025-04-07 PROCEDURE — 84146 ASSAY OF PROLACTIN: CPT

## 2025-04-07 PROCEDURE — 86376 MICROSOMAL ANTIBODY EACH: CPT

## 2025-04-07 PROCEDURE — 84144 ASSAY OF PROGESTERONE: CPT

## 2025-04-07 PROCEDURE — 84482 T3 REVERSE: CPT

## 2025-04-07 PROCEDURE — 82627 DEHYDROEPIANDROSTERONE: CPT

## 2025-04-07 PROCEDURE — 83002 ASSAY OF GONADOTROPIN (LH): CPT

## 2025-04-07 PROCEDURE — 86141 C-REACTIVE PROTEIN HS: CPT

## 2025-04-07 PROCEDURE — 83036 HEMOGLOBIN GLYCOSYLATED A1C: CPT

## 2025-04-07 PROCEDURE — 84402 ASSAY OF FREE TESTOSTERONE: CPT

## 2025-04-07 PROCEDURE — 80061 LIPID PANEL: CPT

## 2025-04-07 PROCEDURE — 84140 ASSAY OF PREGNENOLONE: CPT

## 2025-04-07 PROCEDURE — 82306 VITAMIN D 25 HYDROXY: CPT

## 2025-04-07 PROCEDURE — 80053 COMPREHEN METABOLIC PANEL: CPT

## 2025-04-07 PROCEDURE — 84481 FREE ASSAY (FT-3): CPT

## 2025-04-07 PROCEDURE — 84305 ASSAY OF SOMATOMEDIN: CPT

## 2025-04-07 PROCEDURE — 83090 ASSAY OF HOMOCYSTEINE: CPT

## 2025-04-07 PROCEDURE — 83525 ASSAY OF INSULIN: CPT

## 2025-04-07 PROCEDURE — 85025 COMPLETE CBC W/AUTO DIFF WBC: CPT

## 2025-04-07 PROCEDURE — 82670 ASSAY OF TOTAL ESTRADIOL: CPT

## 2025-04-07 PROCEDURE — 82248 BILIRUBIN DIRECT: CPT

## 2025-04-07 PROCEDURE — 86800 THYROGLOBULIN ANTIBODY: CPT

## 2025-04-07 PROCEDURE — 84443 ASSAY THYROID STIM HORMONE: CPT

## 2025-04-07 PROCEDURE — 84439 ASSAY OF FREE THYROXINE: CPT

## 2025-04-07 PROCEDURE — 83001 ASSAY OF GONADOTROPIN (FSH): CPT

## 2025-04-08 LAB
25(OH)D3 SERPL-MCNC: 54 NG/ML (ref 30–100)
ALBUMIN SERPL BCP-MCNC: 4.1 G/DL (ref 3.2–4.9)
ALBUMIN/GLOB SERPL: 1.2 G/DL
ALP SERPL-CCNC: 92 U/L (ref 30–99)
ALT SERPL-CCNC: 20 U/L (ref 2–50)
ANION GAP SERPL CALC-SCNC: 10 MMOL/L (ref 7–16)
AST SERPL-CCNC: 18 U/L (ref 12–45)
BILIRUB CONJ SERPL-MCNC: 0.3 MG/DL (ref 0.1–0.5)
BILIRUB INDIRECT SERPL-MCNC: 0.6 MG/DL (ref 0–1)
BILIRUB SERPL-MCNC: 0.9 MG/DL (ref 0.1–1.5)
BUN SERPL-MCNC: 16 MG/DL (ref 8–22)
CALCIUM ALBUM COR SERPL-MCNC: 9.5 MG/DL (ref 8.5–10.5)
CALCIUM SERPL-MCNC: 9.6 MG/DL (ref 8.5–10.5)
CHLORIDE SERPL-SCNC: 105 MMOL/L (ref 96–112)
CHOLEST SERPL-MCNC: 166 MG/DL (ref 100–199)
CO2 SERPL-SCNC: 24 MMOL/L (ref 20–33)
CREAT SERPL-MCNC: 0.95 MG/DL (ref 0.5–1.4)
CRP SERPL HS-MCNC: 2.8 MG/L (ref 0–3)
DHEA-S SERPL-MCNC: 39.2 UG/DL (ref 18.9–205)
ESTRADIOL SERPL-MCNC: 5.9 PG/ML
FASTING STATUS PATIENT QL REPORTED: NORMAL
FSH SERPL-ACNC: 114 MIU/ML
GFR SERPLBLD CREATININE-BSD FMLA CKD-EPI: 69 ML/MIN/1.73 M 2
GLOBULIN SER CALC-MCNC: 3.5 G/DL (ref 1.9–3.5)
GLUCOSE SERPL-MCNC: 96 MG/DL (ref 65–99)
HCYS SERPL-SCNC: 7.61 UMOL/L
HDLC SERPL-MCNC: 46 MG/DL
LDLC SERPL CALC-MCNC: 106 MG/DL
LH SERPL-ACNC: 42.2 IU/L
POTASSIUM SERPL-SCNC: 4.1 MMOL/L (ref 3.6–5.5)
PROGEST SERPL-MCNC: 0.18 NG/ML
PROLACTIN SERPL-MCNC: 7.64 NG/ML (ref 2.8–26)
PROT SERPL-MCNC: 7.6 G/DL (ref 6–8.2)
SODIUM SERPL-SCNC: 139 MMOL/L (ref 135–145)
T3FREE SERPL-MCNC: 2.94 PG/ML (ref 2–4.4)
T4 FREE SERPL-MCNC: 1.17 NG/DL (ref 0.93–1.7)
THYROPEROXIDASE AB SERPL-ACNC: 12.3 IU/ML (ref 0–9)
TRIGL SERPL-MCNC: 72 MG/DL (ref 0–149)
TSH SERPL-ACNC: 1.48 UIU/ML (ref 0.38–5.33)

## 2025-04-08 NOTE — TELEPHONE ENCOUNTER
Received request via: Patient    Was the patient seen in the last year in this department? Yes    Does the patient have an active prescription (recently filled or refills available) for medication(s) requested? No    Pharmacy Name: [facility]/PHARMACY #[id number] - [place] - [address]    Does the patient have long term Plus and need 100-day supply? (This applies to ALL medications) Patient does not have SCP

## 2025-04-09 LAB
IGF-I SERPL-MCNC: 108 NG/ML (ref 44–240)
IGF-I Z-SCORE SERPL: -0.1
INSULIN P FAST SERPL-ACNC: 12 UIU/ML (ref 3–25)
THYROGLOB AB SERPL-ACNC: <1.5 IU/ML (ref 0–4)

## 2025-04-11 LAB
ESTRONE SERPL-MCNC: 18.5 PG/ML
SHBG SERPL-SCNC: 71 NMOL/L (ref 17–125)
T3REVERSE SERPL-MCNC: 11.3 NG/DL (ref 9–27)
TESTOST FREE SERPL-MCNC: 1.4 PG/ML (ref 0.6–3.8)
TESTOST SERPL-MCNC: 14 NG/DL (ref 9–55)

## 2025-04-11 RX ORDER — CYCLOBENZAPRINE HCL 5 MG
5 TABLET ORAL
Qty: 90 TABLET | Refills: 1 | Status: SHIPPED | OUTPATIENT
Start: 2025-04-11 | End: 2025-04-21 | Stop reason: SDUPTHER

## 2025-04-12 LAB — PREG SERPL-MCNC: 50 NG/DL (ref 15–132)

## 2025-04-21 ENCOUNTER — OFFICE VISIT (OUTPATIENT)
Dept: MEDICAL GROUP | Facility: CLINIC | Age: 60
End: 2025-04-21
Payer: COMMERCIAL

## 2025-04-21 VITALS
BODY MASS INDEX: 23.9 KG/M2 | DIASTOLIC BLOOD PRESSURE: 71 MMHG | HEART RATE: 92 BPM | TEMPERATURE: 99.2 F | WEIGHT: 140 LBS | SYSTOLIC BLOOD PRESSURE: 149 MMHG | RESPIRATION RATE: 16 BRPM | HEIGHT: 64 IN | OXYGEN SATURATION: 96 %

## 2025-04-21 DIAGNOSIS — Z11.59 NEED FOR HEPATITIS C SCREENING TEST: ICD-10-CM

## 2025-04-21 DIAGNOSIS — M25.50 ARTHRALGIA, UNSPECIFIED JOINT: ICD-10-CM

## 2025-04-21 DIAGNOSIS — Z11.4 ENCOUNTER FOR SCREENING FOR HIV: ICD-10-CM

## 2025-04-21 PROCEDURE — 3077F SYST BP >= 140 MM HG: CPT

## 2025-04-21 PROCEDURE — 99213 OFFICE O/P EST LOW 20 MIN: CPT

## 2025-04-21 PROCEDURE — 3078F DIAST BP <80 MM HG: CPT

## 2025-04-21 RX ORDER — HYDROCHLOROTHIAZIDE 12.5 MG/1
12.5 TABLET ORAL DAILY
Qty: 90 TABLET | Refills: 3 | Status: SHIPPED | OUTPATIENT
Start: 2025-04-21

## 2025-04-21 RX ORDER — CYCLOBENZAPRINE HCL 5 MG
5 TABLET ORAL 3 TIMES DAILY PRN
Qty: 270 TABLET | Refills: 1 | Status: SHIPPED | OUTPATIENT
Start: 2025-04-21

## 2025-04-21 ASSESSMENT — ANXIETY QUESTIONNAIRES
IF YOU CHECKED OFF ANY PROBLEMS ON THIS QUESTIONNAIRE, HOW DIFFICULT HAVE THESE PROBLEMS MADE IT FOR YOU TO DO YOUR WORK, TAKE CARE OF THINGS AT HOME, OR GET ALONG WITH OTHER PEOPLE: NOT DIFFICULT AT ALL
3. WORRYING TOO MUCH ABOUT DIFFERENT THINGS: MORE THAN HALF THE DAYS
7. FEELING AFRAID AS IF SOMETHING AWFUL MIGHT HAPPEN: NOT AT ALL
5. BEING SO RESTLESS THAT IT IS HARD TO SIT STILL: NOT AT ALL
1. FEELING NERVOUS, ANXIOUS, OR ON EDGE: SEVERAL DAYS
6. BECOMING EASILY ANNOYED OR IRRITABLE: SEVERAL DAYS
4. TROUBLE RELAXING: NOT AT ALL
GAD7 TOTAL SCORE: 6
2. NOT BEING ABLE TO STOP OR CONTROL WORRYING: MORE THAN HALF THE DAYS

## 2025-04-21 ASSESSMENT — PATIENT HEALTH QUESTIONNAIRE - PHQ9
5. POOR APPETITE OR OVEREATING: 3 - NEARLY EVERY DAY
CLINICAL INTERPRETATION OF PHQ2 SCORE: 0

## 2025-04-21 ASSESSMENT — FIBROSIS 4 INDEX: FIB4 SCORE: 0.62

## 2025-04-21 NOTE — PROGRESS NOTES
"This note is formatted in an APSO format, for additional subjective and objective evaluation please scroll to the bottom of the note.    CC:  Chief Complaint   Patient presents with    Medication Management     Cyclobenzaprine- 10 mg BID QD      Assessment/Plan:  Problem List Items Addressed This Visit       Arthralgia    Pt reports that pain is controlled by flexeril 10mg bid. We had tried to reduce the dose previously to 5mg daily then 5 bid, but this is ineffective. Sending in 5mg TID which the patient agrees with. Notably she does not tolerate opioids.         Relevant Medications    cyclobenzaprine (FLEXERIL) 5 MG tablet    Other Relevant Orders    MA-SCREENING MAMMO BILAT W/TOMOSYNTHESIS W/CAD    Referral to GI for Colonoscopy     Other Visit Diagnoses         Encounter for screening for HIV        Relevant Orders    HIV AG/AB COMBO ASSAY SCREENING      Need for hepatitis C screening test        Relevant Orders    HEP C VIRUS ANTIBODY           Orders Placed This Encounter    MA-SCREENING MAMMO BILAT W/TOMOSYNTHESIS W/CAD    HIV AG/AB COMBO ASSAY SCREENING    HEP C VIRUS ANTIBODY    Referral to GI for Colonoscopy    hydroCHLOROthiazide 12.5 MG tablet    cyclobenzaprine (FLEXERIL) 5 MG tablet     HISTORY OF PRESENT ILLNESS:   BP at home is 128/89 in the morning.     Biopsy was negative. Prebiotics and probiotics are helping.    Problem   Arthralgia       Exam:    BP (!) 149/71 (BP Location: Left arm, Patient Position: Sitting, BP Cuff Size: Adult)   Pulse 92   Temp 37.3 °C (99.2 °F) (Temporal)   Resp 16   Ht 1.626 m (5' 4\")   Wt 63.5 kg (140 lb)   SpO2 96%   BMI 24.03 kg/m²  Body mass index is 24.03 kg/m².    Gen: Well appearing. No apparent distress. Well developed. Sitting comfortably on chair  HEENT: NCAT, MMM  Neck: Supple, FROM  Chest: No deformities, Equal chest expansion  Lungs: Normal effort, CTA bilaterally.  CV: Regular rate and rhythm. Pulse palpable. No murmur  Abd: Non-distended.   Ext: No " cyanosis. No edema.  Skin: Warm/dry. No visible rashes.  Neuro: Non-focal. A&Ox4.  Psych: Normal behavior, normal affect      No follow-ups on file.    Roman Monet MD  [facility]

## 2025-04-22 PROBLEM — M25.50 ARTHRALGIA: Status: ACTIVE | Noted: 2025-04-22

## 2025-04-22 NOTE — ASSESSMENT & PLAN NOTE
Pt reports that pain is controlled by flexeril 10mg bid. We had tried to reduce the dose previously to 5mg daily then 5 bid, but this is ineffective. Sending in 5mg TID which the patient agrees with. Notably she does not tolerate opioids.

## 2025-04-28 NOTE — Clinical Note
REFERRAL APPROVAL NOTICE         Sent on April 28, 2025                   Courtney Gupta  [address]    [address]  [place] [address]                   Dear Ms. Gupta,    After a careful review of the medical information and benefit coverage, [facility] has processed your referral. See below for additional details.    If applicable, you must be actively enrolled with your insurance for coverage of the authorized service. If you have any questions regarding your coverage, please contact your insurance directly.    REFERRAL INFORMATION   Referral #:  [id number]  Referred-To Provider    Referred-By Provider:  Gastroenterology    Roman Monet M.D.   [facility]      [address] [facility] [place] [address]  [phone] [address] [place] [address]  [phone]    Referral Start Date:  04/21/2025  Referral End Date:   04/21/2026             SCHEDULING  If you do not already have an appointment, please call [phone] to make an appointment.     MORE INFORMATION  If you do not already have a [employer] account, sign up at: [email or web address]  You can access your medical information, make appointments, see lab results, billing information, and more.  If you have questions regarding this referral, please contact  the [facility] Referrals department at:             [phone]. Monday - Friday 8:00AM - 5:00PM.     Sincerely,    [facility]

## 2025-06-30 ENCOUNTER — OFFICE VISIT (OUTPATIENT)
Dept: MEDICAL GROUP | Facility: CLINIC | Age: 60
End: 2025-06-30
Payer: COMMERCIAL

## 2025-06-30 ENCOUNTER — APPOINTMENT (OUTPATIENT)
Dept: RADIOLOGY | Facility: OTHER | Age: 60
End: 2025-06-30
Attending: FAMILY MEDICINE
Payer: COMMERCIAL

## 2025-06-30 ENCOUNTER — HOSPITAL ENCOUNTER (OUTPATIENT)
Dept: LAB | Facility: MEDICAL CENTER | Age: 60
End: 2025-06-30
Attending: FAMILY MEDICINE
Payer: COMMERCIAL

## 2025-06-30 VITALS
BODY MASS INDEX: 24.24 KG/M2 | HEIGHT: 64 IN | TEMPERATURE: 98 F | WEIGHT: 142 LBS | DIASTOLIC BLOOD PRESSURE: 84 MMHG | OXYGEN SATURATION: 97 % | HEART RATE: 91 BPM | SYSTOLIC BLOOD PRESSURE: 132 MMHG

## 2025-06-30 DIAGNOSIS — Z02.89 EXAMINATION, PHYSICAL, EMPLOYEE: ICD-10-CM

## 2025-06-30 DIAGNOSIS — Z23 NEED FOR VACCINATION: Primary | ICD-10-CM

## 2025-06-30 LAB — RUBV AB SER QL: >500 IU/ML

## 2025-06-30 PROCEDURE — 86765 RUBEOLA ANTIBODY: CPT

## 2025-06-30 PROCEDURE — 71046 X-RAY EXAM CHEST 2 VIEWS: CPT | Mod: TC,FY | Performed by: FAMILY MEDICINE

## 2025-06-30 PROCEDURE — 36415 COLL VENOUS BLD VENIPUNCTURE: CPT

## 2025-06-30 PROCEDURE — 86735 MUMPS ANTIBODY: CPT

## 2025-06-30 PROCEDURE — 86787 VARICELLA-ZOSTER ANTIBODY: CPT

## 2025-06-30 PROCEDURE — 86762 RUBELLA ANTIBODY: CPT

## 2025-06-30 ASSESSMENT — FIBROSIS 4 INDEX: FIB4 SCORE: 0.62

## 2025-06-30 NOTE — ASSESSMENT & PLAN NOTE
Two forms read through with the patient.  Labs ordered to show MMR and varicella immunity.  CXR ordered.  PPD ordered as asked for by the form.  PPD not available at our office (test is backordered) and patient is aware that she will go to the health department to get this done.  Hep B #1 given today.  She is aware that hep B #2 is due in one month and hepatitis B#3 in 6 months. 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 All questions answered.

## 2025-06-30 NOTE — PROGRESS NOTES
"Subjective:     CC:  The primary encounter diagnosis was Need for vaccination. A diagnosis of Examination, physical, employee was also pertinent to this visit.    HISTORY OF THE PRESENT ILLNESS: Patient is a 59 y.o. female. 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  She does not have a vaccine record.  WebIZ pulled and shows Tdap 2022 and Covid vaccines x 2.  No other vaccines available through media or CareEverywhere.She is a never smoker and feels well. Takes 3 medications for HTN.  BP well controlled.  Asx.  Compliant with therapy.          Allergies: Other drug    Current Medications and Prescriptions Ordered in Epic[1]    Past Medical History[2]    Patient Care Team                Roman Monet M.D. PCP - General, Family Medicine [phone] [address] [place] [address]             Past Surgical History[3]    Social History[4]    Social History     Social History Narrative    Not on file       Family History   Problem Relation Age of Onset    No Known Problems Mother     Heart Disease Father     Diabetes Father      Past Medical History[5]  Problem List[6]  Past Surgical History[7]      ROS:   Gen: no significant weight loss or gain. No fever or chills.  HEENT: no ear pain. No visual changes.  Heart: no chest pain or palpitations  Lungs: no wheezing or cough  Abd: no abdominal pain or distention  GI: no diarrhea or constipation  Ext: no edema or erythema      Objective:     Exam: /84   Pulse 91   Temp 36.7 °C (98 °F)   Ht 1.626 m (5' 4\")   Wt 64.4 kg (142 lb)   SpO2 97%  Body mass index is 24.37 kg/m².    General: Normal appearing. No distress.  HEENT: Normocephalic. Eyes conjunctiva clear lids without ptosis, pupils equal and reactive to light accommodation, ears normal shape and contour, canals are clear bilaterally, " tympanic membranes are benign, nasal mucosa benign, oropharynx is without erythema, edema or exudates.   Neck: Thyroid is not enlarged.  Pulmonary: Clear to ausculation.  Normal effort. No rales, ronchi, or wheezing.  Cardiovascular: Regular rate and rhythm without murmur.   Abdomen: Soft, nontender, nondistended. Normal bowel sounds.   Neurologic: Grossly nonfocal  Lymph: No cervical, supraclavicular or axillary lymph nodes are palpable  Skin: Warm and dry.  No obvious lesions.  Musculoskeletal: Normal gait.  Psych: Normal mood and affect. Alert and oriented x3. Judgment and insight is normal.            Assessment & Plan:   59 y.o. female with the following -    Problem List Items Addressed This Visit       Examination, physical, employee    Two forms read through with the patient.  Labs ordered to show MMR and varicella immunity.  CXR ordered.  PPD ordered as asked for by the form.  PPD not available at our office (test is backordered) and patient is aware that she will go to the health department to get this done.  Hep B #1 given today.  She is aware that hep B #2 is due in one month and hepatitis B#3 in 6 months. 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 All questions answered.          Relevant Orders    RUBEOLA IGG AB    MUMPS AB IGG    RUBELLA ABS IGG    VARICELLA ZOSTER IGG AB    DX-CHEST-2 VIEWS     Other Visit Diagnoses         Need for vaccination    -  Primary    Relevant Orders    Hepatitis B Vaccine Adult 20+ (Completed)    Pneumococcal Conjugate Vaccine 20-Valent (6 wks+) (Completed)                 I spent a total of 35 minutes with record review, exam, communication with the patient, communication with other providers, and documentation of this encounter.    No follow-ups on file.           [1]   Current Outpatient  "Medications Ordered in Epic   Medication Sig Dispense Refill    hydroCHLOROthiazide 12.5 MG tablet Take 1 Tablet by mouth every day. 90 Tablet 3    cyclobenzaprine (FLEXERIL) 5 MG tablet Take 1 Tablet by mouth 3 times a day as needed for Muscle Spasms or Moderate Pain. 270 Tablet 1    amLODIPine (NORVASC) 5 MG Tab Take 1 Tablet by mouth every day. 100 Tablet 3    losartan (COZAAR) 100 MG Tab Take 1 Tablet by mouth every day. 90 Tablet 3    Multiple Vitamins-Minerals (EMERGEN-C VITAMIN C PO) Take  by mouth every day.       No current [facility]-ordered facility-administered medications on file.   [2]   Past Medical History:  Diagnosis Date    Anesthesia 12/14/2023    Pt states she was given narcotics prior to one of her surgeries which made her sick and she aspirated during the surgery.    Fibromyalgia     GERD (gastroesophageal reflux disease)     Hypertension    [3]   Past Surgical History:  Procedure Laterality Date    [place] POST COLPORRHAPHY,RECTUM/VAGINA N/A 1/5/2024    Procedure: PELVIC EXAM UNDER ANESTHESIA, POSTERIOR REPAIR, PERINEORRHAPHY, VAULT SUSPENSION;  Surgeon: Santosh Maher M.D.;  Location: SURGERY SAME DAY [facility];  Service: Gynecology    EXAM UNDER ANESTHESIA N/A 1/5/2024    Procedure: EXAM UNDER ANESTHESIA;  Surgeon: Santosh Maher M.D.;  Location: SURGERY SAME DAY [facility];  Service: Gynecology    CHOLECYSTECTOMY      LUMPECTOMY      VAGINAL HYSTERECTOMY TOTAL      Hysterectomy,Total Vaginal   [4]   Social History  Tobacco Use    Smoking status: Never    Smokeless tobacco: Never   Vaping Use    Vaping status: Never Used   Substance Use Topics    Alcohol use: Yes     Comment: \"2 glasses of white wine a week, maybe\".    Drug use: Never   [5]   Past Medical History:  Diagnosis Date    Anesthesia 12/14/2023    Pt states she was given narcotics prior to one of her surgeries which made her sick and she aspirated during the surgery.    Fibromyalgia     GERD (gastroesophageal reflux disease)     Hypertension  "   [6]   Patient Active Problem List  Diagnosis    Polyarthritis with positive rheumatoid factor (HCC)    Hypertension    Chronic fatigue    Chronic nonintractable headache    Palpitations    Vaginal prolapse    Rectocele    Perineocele    Outlet dysfunction constipation    Atrophic vaginitis    Vaginal vault prolapse    Multiple thyroid nodules    GERD (gastroesophageal reflux disease)    Neck pain    Arthralgia    Examination, physical, employee   [7]   Past Surgical History:  Procedure Laterality Date    [place] POST COLPORRHAPHY,RECTUM/VAGINA N/A 1/5/2024    Procedure: PELVIC EXAM UNDER ANESTHESIA, POSTERIOR REPAIR, PERINEORRHAPHY, VAULT SUSPENSION;  Surgeon: Santosh Maher M.D.;  Location: SURGERY SAME DAY [facility];  Service: Gynecology    EXAM UNDER ANESTHESIA N/A 1/5/2024    Procedure: EXAM UNDER ANESTHESIA;  Surgeon: Santosh Maher M.D.;  Location: SURGERY SAME DAY [facility];  Service: Gynecology    CHOLECYSTECTOMY      LUMPECTOMY      VAGINAL HYSTERECTOMY TOTAL      Hysterectomy,Total Vaginal

## 2025-07-02 LAB
MEV IGG SER-ACNC: 10.5 AU/ML
MUV IGG SER IA-ACNC: 171 AU/ML
VZV IGG SER IA-ACNC: 4.94 S/CO

## 2025-07-07 ENCOUNTER — NON-PROVIDER VISIT (OUTPATIENT)
Dept: MEDICAL GROUP | Facility: CLINIC | Age: 60
End: 2025-07-07
Payer: COMMERCIAL

## 2025-07-07 ENCOUNTER — APPOINTMENT (OUTPATIENT)
Dept: MEDICAL GROUP | Facility: CLINIC | Age: 60
End: 2025-07-07
Payer: COMMERCIAL

## 2025-07-07 DIAGNOSIS — Z00.00 PREVENTATIVE HEALTH CARE: Primary | ICD-10-CM

## 2025-07-07 PROCEDURE — 86580 TB INTRADERMAL TEST: CPT | Performed by: FAMILY MEDICINE

## 2025-07-07 NOTE — PROGRESS NOTES
Courtney Gupta is a 59 y.o. female here for a non-provider visit for PPD placement -- Step 1 of 1    Reason for PPD:  work requirement    1. TB evaluation questionnaire completed by patient? Yes      -  If any answers marked yes did you contact a provider prior to placing? Yes  2.  Patient notified to return to clinic for reading on: 07/09/25  3.  PPD Placement documentation completed on TB evaluation questionnaire? YES  4.  Location of TB evaluation questionnaire filed: [facility]

## 2025-07-09 NOTE — PROGRESS NOTES
"Urogynecology and Pelvic Reconstructive Surgery Follow Up    Courtney Gupta MRN:[id number] :1965    Referred by: Gale Davis MD    Reason for Visit:   Chief Complaint   Patient presents with    Follow-Up     Exam          Subjective     History of Presenting Illness:    Courtney Gupta is a 59 y.o. year old P3 who presents for follow up.  In January of last year she underwent SSLF/[place]/PNR for prolapse    Yesterday she noted a slight protrusion and got worried, although this is since gone away.  She presents today for reevaluation of recurrent prolapse    Otherwise she is doing well, continues to have significant proved in her rectal emptying.  She is not currently sexually active.          Initial HPI: She was referred by Dr. Davis for the evaluation and management of prolapse.      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  She has a splint on her perineum and noted to have bowel movements frequently    She also gets kidney stones and was referred to urology at her recent PCP visit (9/15)    She has suspected Crohn's, not confirmed. Also has UC, just takes omeprazole, no immusuppression.       Prior Pelvic surgery:    abdominal hysterectomy, no mesh  Multiple other laparotomies for fibroids/cysts, unclear  Cholecystectomy  2024 Sacrospinous colpopexy, posterior repair, enterocele repair, perieorrhaphy (en)     Prior treatment:   Kegels and online pelvic PT       Pelvic floor symptom review:     Bladder:   Voids per day: 2-3 Voids per night: 1      Urinary incontinence episodes per day: none    Bladder emptying: Complete   Voiding symptoms: None   UTI in last 12 months: None   Other urologic history: Stones      Prolapse:     Prolapse symptoms: resolved     Bowel:    Constipation: Yes - not too bothersome   Bowel movements per day: 1-2    Straining to empty " bowels: Yes--> improved   Splinting to evacuate: Yes--> resolved   Painful evacuation: Yes   Difficulty emptying rectum: No    Incontinence to stool: No   Blood in stool: No    Hemorrhoids: No    Bowel conditions: Ulcerative Colitis   Most recent colonoscopy: scheduled 10/2023       Sexual function:    Sexually active: No     Gender of partners: Male   Pain with intercourse: No            Past medical and surgical history    Past obstetric history   Number of vaginal deliveries: 3   Number of  deliveries: 0   History of vacuum/forceps: No    History of obstetric anal sphincter injury: Yes    Past gynecological history:    Last menstrual period: No LMP recorded. Patient has had a hysterectomy.       Past medical history:  Past Medical History:   Diagnosis Date    Anesthesia 2023    Pt states she was given narcotics prior to one of her surgeries which made her sick and she aspirated during the surgery.    Fibromyalgia     GERD (gastroesophageal reflux disease)     Hypertension      Past surgical history:  Past Surgical History:   Procedure Laterality Date    [place] POST COLPORRHAPHY,RECTUM/VAGINA N/A 2024    Procedure: PELVIC EXAM UNDER ANESTHESIA, POSTERIOR REPAIR, PERINEORRHAPHY, VAULT SUSPENSION;  Surgeon: Santosh Maher M.D.;  Location: SURGERY SAME DAY [facility];  Service: Gynecology    EXAM UNDER ANESTHESIA N/A 2024    Procedure: EXAM UNDER ANESTHESIA;  Surgeon: Santosh Maher M.D.;  Location: SURGERY SAME DAY [facility];  Service: Gynecology    CHOLECYSTECTOMY      LUMPECTOMY      VAGINAL HYSTERECTOMY TOTAL      Hysterectomy,Total Vaginal     Medications:has a current medication list which includes the following prescription(s): hydrochlorothiazide, cyclobenzaprine, amlodipine, losartan, and multiple vitamins-minerals.  Allergies:Other drug  Family history:  Family History   Problem Relation Age of Onset    No Known Problems Mother     Heart Disease Father     Diabetes Father      Social history:  "reports that she has never smoked. She has never used smokeless tobacco. She reports current alcohol use. She reports that she does not use drugs.    Review of systems: A full review of systems was performed, and negative with the exception of want is noted above in the HPI.        Objective        /70 (BP Location: Right arm, Patient Position: Sitting, BP Cuff Size: Adult)   Pulse 100   Ht 5' 4.5\"   Wt 142 lb   BMI 24.00 kg/m²     Physical Exam  Vitals reviewed. Exam conducted with a chaperone present (MA - see notes.).   Constitutional:       Appearance: Normal appearance.   HENT:      Head: Normocephalic.      Mouth/Throat:      Mouth: Mucous membranes are moist.   Cardiovascular:      Rate and Rhythm: Normal rate.   Pulmonary:      Effort: Pulmonary effort is normal.   Abdominal:      Palpations: Abdomen is soft. There is no mass.      Tenderness: There is no abdominal tenderness.   Skin:     General: Skin is warm and dry.   Neurological:      Mental Status: She is alert.   Psychiatric:         Mood and Affect: Mood normal.         Genitourinary:    External female genitalia: WNL   Vulva: WNL   Bulbocavernosus reflex: Intact   Anal wink reflex: Intact   Perineal sensation: WNL   Urethra: Atrophic   Vagina: Atrophic   Atrophy: Moderate   Cough stress test: Negative    Pelvic floor: (Postop)   POP-Q: Aa -1.5 / Ba -1.5 / TVL 8 / C -5 / D X / Ap -3 / Bp -3 / GH 4.5 / PB 2   Some levator hypertonicity noted, especially on the left/puborectalis minimal sacrospinous tenderness    Procedure Performed: No    Diagnostic test and records review:    Labs: n/a    Radiology: n/a    Documentation reviewed: Prior EMR Records           Assessment & Plan     Courtney Gupta is a 59 y.o. year old P3 with prolapse, now postop s/p native tissue repair    1. Rectocele  2. Perineocele  3. Vaginal vault prolapse  4. Outlet dysfunction constipation  -S/p sacrospinous vault suspension, posterior pair/perineorrhaphy.    - " Transient subjective feeling of prolapse.  May correlate with a slight stage I cystocele seen on examination today.  Overall counseled on normal postoperative exam status, and only to pursue further treatment options if she has persistent or worsening prolapse symptoms.  - Continue observation      5. Atrophic vaginitis  Represcribed vaginal estrogen today, will [facility] as this was not previously covered.  Counseled this is one of the most important treatments to prevent UTI (she had a serious 1 last year), as well as subjective prolapse feeling and preventing urinary symptoms long-term.      F/u as needed           Santosh Maher MD, FACOG  Urogynecology and Pelvic Reconstructive Surgery  Department of Obstetrics and Gynecology  [facility]        This medical record contains text that has been entered with the assistance of computer voice recognition and dictation software.  Therefore, it may contain unintended errors in text, spelling, punctuation, or grammar

## 2025-07-10 ENCOUNTER — GYNECOLOGY VISIT (OUTPATIENT)
Dept: GYNECOLOGY | Facility: CLINIC | Age: 60
End: 2025-07-10
Payer: COMMERCIAL

## 2025-07-10 VITALS
HEIGHT: 65 IN | DIASTOLIC BLOOD PRESSURE: 70 MMHG | BODY MASS INDEX: 23.66 KG/M2 | WEIGHT: 142 LBS | HEART RATE: 100 BPM | SYSTOLIC BLOOD PRESSURE: 106 MMHG

## 2025-07-10 DIAGNOSIS — N95.8 GENITOURINARY SYNDROME OF MENOPAUSE: ICD-10-CM

## 2025-07-10 DIAGNOSIS — R10.2 PELVIC PRESSURE IN FEMALE: Primary | ICD-10-CM

## 2025-07-10 PROBLEM — N81.9 VAGINAL VAULT PROLAPSE: Status: RESOLVED | Noted: 2023-09-19 | Resolved: 2025-07-10

## 2025-07-10 PROBLEM — M05.80 POLYARTHRITIS WITH POSITIVE RHEUMATOID FACTOR (HCC): Status: RESOLVED | Noted: 2021-11-02 | Resolved: 2025-07-10

## 2025-07-10 PROBLEM — N81.81 PERINEOCELE: Status: RESOLVED | Noted: 2023-09-19 | Resolved: 2025-07-10

## 2025-07-10 PROBLEM — N81.10 VAGINAL PROLAPSE: Status: RESOLVED | Noted: 2022-07-28 | Resolved: 2025-07-10

## 2025-07-10 PROCEDURE — 99213 OFFICE O/P EST LOW 20 MIN: CPT | Performed by: STUDENT IN AN ORGANIZED HEALTH CARE EDUCATION/TRAINING PROGRAM

## 2025-07-10 PROCEDURE — 3078F DIAST BP <80 MM HG: CPT | Performed by: STUDENT IN AN ORGANIZED HEALTH CARE EDUCATION/TRAINING PROGRAM

## 2025-07-10 PROCEDURE — 3074F SYST BP LT 130 MM HG: CPT | Performed by: STUDENT IN AN ORGANIZED HEALTH CARE EDUCATION/TRAINING PROGRAM

## 2025-07-10 ASSESSMENT — FIBROSIS 4 INDEX: FIB4 SCORE: 0.62

## 2025-07-11 ENCOUNTER — TELEPHONE (OUTPATIENT)
Dept: OBGYN | Facility: CLINIC | Age: 60
End: 2025-07-11
Payer: COMMERCIAL

## 2025-07-11 RX ORDER — ESTRADIOL 0.1 MG/G
CREAM VAGINAL
Qty: 1 EACH | Refills: 6 | Status: SHIPPED | OUTPATIENT
Start: 2025-07-11

## 2025-07-11 NOTE — TELEPHONE ENCOUNTER
Caller Name: Courtney Gupta  Call Back Number: [phone]    How would the patient prefer to be contacted with a response: Phone call do NOT leave a detailed message    Pt called just to inform  she created her [facility] account and the ID is [id number] if he needed that as well. I informed the patient I would forward this information to  and his medical assistant.

## 2025-07-14 ENCOUNTER — APPOINTMENT (OUTPATIENT)
Dept: MEDICAL GROUP | Facility: CLINIC | Age: 60
End: 2025-07-14
Payer: COMMERCIAL

## 2025-07-14 DIAGNOSIS — Z23 ENCOUNTER FOR ADMINISTRATION OF VACCINE: Primary | ICD-10-CM

## 2025-07-16 ENCOUNTER — NON-PROVIDER VISIT (OUTPATIENT)
Dept: MEDICAL GROUP | Facility: CLINIC | Age: 60
End: 2025-07-16
Payer: COMMERCIAL

## 2025-07-16 DIAGNOSIS — Z11.1 TUBERCULOSIS SCREENING: Primary | ICD-10-CM

## 2025-07-16 PROCEDURE — 86580 TB INTRADERMAL TEST: CPT | Performed by: STUDENT IN AN ORGANIZED HEALTH CARE EDUCATION/TRAINING PROGRAM

## 2025-07-16 NOTE — PROGRESS NOTES
Courtney Gupta is a 59 y.o. female here for a non-provider visit for PPD placement -- Step 1 of 1    Reason for PPD:  work requirement    1. TB evaluation questionnaire completed by patient? Yes      -  If any answers marked yes did you contact a provider prior to placing? Not Indicated  2.  Patient notified to return to clinic for reading on: 07/18/2025  3.  PPD Placement documentation completed on TB evaluation questionnaire? Yes  4.  Location of TB evaluation questionnaire filed: Scanned in chart.

## 2025-07-18 ENCOUNTER — NON-PROVIDER VISIT (OUTPATIENT)
Dept: MEDICAL GROUP | Facility: CLINIC | Age: 60
End: 2025-07-18
Payer: COMMERCIAL

## 2025-07-18 PROCEDURE — 99999 PR NO CHARGE: CPT | Performed by: FAMILY MEDICINE

## 2025-07-18 NOTE — PROGRESS NOTES
Courtney Gupta is a 59 y.o. female here for a non-provider visit for PPD reading 3/3.      1.  Resulted in Epic under enter/edit results? Yes   2.  TB evaluation questionnaire scanned into chart and original given to patient?Yes      3. Was induration greater than 0 mm? No.    If Step 1 of 2, when is patient returning for second step (delete if N/A): N/A    Routed to PCP? Yes

## 2025-07-27 ENCOUNTER — PATIENT MESSAGE (OUTPATIENT)
Dept: MEDICAL GROUP | Facility: CLINIC | Age: 60
End: 2025-07-27
Payer: COMMERCIAL

## 2025-07-28 ENCOUNTER — APPOINTMENT (OUTPATIENT)
Dept: MEDICAL GROUP | Facility: CLINIC | Age: 60
End: 2025-07-28
Payer: COMMERCIAL

## 2025-08-04 ENCOUNTER — APPOINTMENT (OUTPATIENT)
Dept: MEDICAL GROUP | Facility: CLINIC | Age: 60
End: 2025-08-04
Payer: COMMERCIAL

## 2025-08-05 ENCOUNTER — TELEPHONE (OUTPATIENT)
Dept: MEDICAL GROUP | Facility: CLINIC | Age: 60
End: 2025-08-05
Payer: COMMERCIAL

## (undated) DEVICE — CANISTER SUCTION RIGID RED 1500CC (40EA/CA)

## (undated) DEVICE — SUTURE 2-0 STRATAFIX SPIRAL PDS SH (12EA/BX)

## (undated) DEVICE — SENSOR OXIMETER ADULT SPO2 RD SET (20EA/BX)

## (undated) DEVICE — GLOVE SZ 7.5 BIOGEL PI MICRO - PF LF (50PR/BX)

## (undated) DEVICE — SLEEVE VASO CALF MED - (10PR/CA)

## (undated) DEVICE — PAD SANITARY 11IN MAXI IND WRAPPED  (12EA/PK 24PK/CA)

## (undated) DEVICE — SUTURE 2-0 VICRYL PLUS SH - 27 INCH (36/BX)

## (undated) DEVICE — TUBE CONNECTING SUCTION - CLEAR PLASTIC STERILE 72 IN (50EA/CA)

## (undated) DEVICE — KIT  I.V. START (100EA/CA)

## (undated) DEVICE — CANISTER SUCTION 3000ML MECHANICAL FILTER AUTO SHUTOFF MEDI-VAC NONSTERILE LF DISP  (40EA/CA)

## (undated) DEVICE — NEEDLE DAVIS TONSIL 1/2 CIR - (2EA/PK20PK/BX)

## (undated) DEVICE — LACTATED RINGERS INJ 1000 ML - (14EA/CA 60CA/PF)

## (undated) DEVICE — CORDS BIPOLAR COAGULATION - 12FT STERILE DISP. (10EA/BX)

## (undated) DEVICE — TOWEL STOP TIMEOUT SAFETY FLAG (40EA/CA)

## (undated) DEVICE — KIT SURGIFLO W/OUT THROMBIN - (6EA/CA)

## (undated) DEVICE — TUBING CLEARLINK DUO-VENT - C-FLO (48EA/CA)

## (undated) DEVICE — MASK OXYGEN VNYL ADLT MED CONC WITH 7 FOOT TUBING  - (50EA/CA)

## (undated) DEVICE — Device

## (undated) DEVICE — GOWN WARMING STANDARD FLEX - (30/CA)

## (undated) DEVICE — SUCTION INSTRUMENT YANKAUER BULBOUS TIP W/O VENT (50EA/CA)

## (undated) DEVICE — TRAY FOLEY CATHETER STATLOCK 16FR SURESTEP  (10EA/CA)

## (undated) DEVICE — SET IRRIGATION CYSTOSCOPY TUBE L80 IN (20EA/CA)

## (undated) DEVICE — GLOVE BIOGEL SZ 7 SURGICAL PF LTX - (50PR/BX 4BX/CA)

## (undated) DEVICE — GLOVE BIOGEL INDICATOR SZ 7.5 SURGICAL PF LTX - (50PR/BX 4BX/CA)

## (undated) DEVICE — SUTURE GENERAL

## (undated) DEVICE — MONODEK SUTURE - 12/BX

## (undated) DEVICE — SET LEADWIRE 5 LEAD BEDSIDE DISPOSABLE ECG (1SET OF 5/EA)

## (undated) DEVICE — SCRUB SOLUTION EXIDINE 4% 40Z - 48/CS CHLORAHEXADINE GLUCONATE

## (undated) DEVICE — SODIUM CHL IRRIGATION 0.9% 1000ML (12EA/CA)

## (undated) DEVICE — TUBE E-T HI-LO CUFF 6.5MM (10EA/BX)

## (undated) DEVICE — SUTURE CAPIO (12EA/BX)

## (undated) DEVICE — BLADE SURGICAL #10 - (50/BX)

## (undated) DEVICE — SUTURE 0 VICRYL PLUS CT-2 - 27 INCH (36/BX)

## (undated) DEVICE — WATER IRRIGATION STERILE 1000ML (12EA/CA)

## (undated) DEVICE — DEVICE SUTURE CAPTURING

## (undated) DEVICE — PACKING VAG 2 X-RAY (24EA/CS)

## (undated) DEVICE — CANNULA O2 COMFORT SOFT EAR ADULT 7 FT TUBING (50/CA)

## (undated) DEVICE — GLOVE SZ 6 BIOGEL PI MICRO - PF LF (50PR/BX 4BX/CA)

## (undated) DEVICE — GLOVE BIOGEL PI INDICATOR SZ 7.5 SURGICAL PF LF -(50/BX 4BX/CA)